# Patient Record
Sex: FEMALE | Race: WHITE | NOT HISPANIC OR LATINO | Employment: OTHER | URBAN - METROPOLITAN AREA
[De-identification: names, ages, dates, MRNs, and addresses within clinical notes are randomized per-mention and may not be internally consistent; named-entity substitution may affect disease eponyms.]

---

## 2019-10-27 PROBLEM — R49.0 DYSPHONIA: Status: ACTIVE | Noted: 2019-10-27

## 2019-10-27 PROBLEM — J30.2 SEASONAL ALLERGIC RHINITIS: Status: ACTIVE | Noted: 2019-10-27

## 2019-10-27 PROBLEM — J38.3 GLOTTIC INSUFFICIENCY: Status: ACTIVE | Noted: 2019-10-27

## 2019-10-27 PROBLEM — R13.14 PHARYNGOESOPHAGEAL DYSPHAGIA: Status: ACTIVE | Noted: 2019-10-27

## 2019-10-27 PROBLEM — Z98.890 HISTORY OF CAROTID ENDARTERECTOMY: Status: ACTIVE | Noted: 2019-10-27

## 2019-10-27 PROBLEM — J38.01 PARALYSIS OF LEFT VOCAL FOLD: Status: ACTIVE | Noted: 2019-10-27

## 2019-10-27 PROBLEM — R49.0 MUSCLE TENSION DYSPHONIA: Status: ACTIVE | Noted: 2019-10-27

## 2019-10-27 PROBLEM — Z87.891 HISTORY OF TOBACCO USE: Status: ACTIVE | Noted: 2019-10-27

## 2019-11-26 PROBLEM — R76.8 ANA POSITIVE: Status: ACTIVE | Noted: 2019-11-26

## 2020-04-23 ENCOUNTER — TELEPHONE (OUTPATIENT)
Dept: FAMILY MEDICINE CLINIC | Facility: CLINIC | Age: 80
End: 2020-04-23

## 2020-04-23 DIAGNOSIS — J30.2 SEASONAL ALLERGIES: ICD-10-CM

## 2020-04-23 RX ORDER — FLUTICASONE PROPIONATE 50 MCG
SPRAY, SUSPENSION (ML) NASAL
Qty: 9.9 ML | Refills: 3 | Status: SHIPPED | OUTPATIENT
Start: 2020-04-23 | End: 2020-09-11 | Stop reason: SDUPTHER

## 2020-04-24 ENCOUNTER — TELEMEDICINE (OUTPATIENT)
Dept: FAMILY MEDICINE CLINIC | Facility: CLINIC | Age: 80
End: 2020-04-24
Payer: MEDICARE

## 2020-04-24 DIAGNOSIS — L08.9 SKIN INFECTION: ICD-10-CM

## 2020-04-24 DIAGNOSIS — B35.0 SCALP DERMATOPHYTOSIS: ICD-10-CM

## 2020-04-24 DIAGNOSIS — L40.9 SCALP PSORIASIS: Primary | ICD-10-CM

## 2020-04-24 PROBLEM — I83.813 VARICOSE VEINS OF BILATERAL LOWER EXTREMITIES WITH PAIN: Status: ACTIVE | Noted: 2019-04-28

## 2020-04-24 PROBLEM — I10 ESSENTIAL HYPERTENSION: Status: ACTIVE | Noted: 2017-11-08

## 2020-04-24 PROBLEM — E55.9 VITAMIN D DEFICIENCY: Status: ACTIVE | Noted: 2017-11-08

## 2020-04-24 PROBLEM — E78.2 MIXED HYPERLIPIDEMIA: Status: ACTIVE | Noted: 2017-11-08

## 2020-04-24 PROBLEM — J38.00 VOCAL CORD PARESIS: Status: ACTIVE | Noted: 2019-11-04

## 2020-04-24 PROBLEM — E87.1 HYPONATREMIA: Status: ACTIVE | Noted: 2020-01-10

## 2020-04-24 PROCEDURE — 99214 OFFICE O/P EST MOD 30 MIN: CPT | Performed by: NURSE PRACTITIONER

## 2020-04-24 RX ORDER — FLUTICASONE PROPIONATE 50 MCG
1 SPRAY, SUSPENSION (ML) NASAL
COMMUNITY
Start: 2019-10-09 | End: 2020-04-24 | Stop reason: SDUPTHER

## 2020-04-24 RX ORDER — LATANOPROST 50 UG/ML
SOLUTION/ DROPS OPHTHALMIC
COMMUNITY
Start: 2003-11-20 | End: 2021-09-17

## 2020-04-24 RX ORDER — ASPIRIN 81 MG/1
81 TABLET, COATED ORAL DAILY
COMMUNITY
Start: 2020-02-27 | End: 2020-05-22 | Stop reason: SDUPTHER

## 2020-04-24 RX ORDER — LABETALOL 100 MG/1
TABLET, FILM COATED ORAL
COMMUNITY
Start: 2020-04-10 | End: 2020-09-11 | Stop reason: SDUPTHER

## 2020-04-24 RX ORDER — KETOCONAZOLE 20 MG/ML
SHAMPOO TOPICAL
COMMUNITY
Start: 2020-02-14 | End: 2020-09-11 | Stop reason: SDUPTHER

## 2020-04-24 RX ORDER — METHYLPREDNISOLONE 4 MG/1
TABLET ORAL
Qty: 21 EACH | Refills: 0 | Status: SHIPPED | OUTPATIENT
Start: 2020-04-24 | End: 2020-09-11 | Stop reason: ALTCHOICE

## 2020-04-24 RX ORDER — HYDROXYZINE HCL 10 MG/5 ML
10 SOLUTION, ORAL ORAL 2 TIMES DAILY
Qty: 100 ML | Refills: 0 | Status: SHIPPED | OUTPATIENT
Start: 2020-04-24 | End: 2020-09-11 | Stop reason: ALTCHOICE

## 2020-04-24 RX ORDER — CEPHALEXIN 250 MG/5ML
500 POWDER, FOR SUSPENSION ORAL EVERY 8 HOURS SCHEDULED
Qty: 300 ML | Refills: 0 | Status: SHIPPED | OUTPATIENT
Start: 2020-04-24 | End: 2020-05-04

## 2020-04-24 RX ORDER — NYSTATIN 100000 U/G
OINTMENT TOPICAL 2 TIMES DAILY
Qty: 60 G | Refills: 0 | Status: SHIPPED | OUTPATIENT
Start: 2020-04-24 | End: 2021-04-09 | Stop reason: ALTCHOICE

## 2020-04-24 RX ORDER — ATORVASTATIN CALCIUM 40 MG/1
80 TABLET, FILM COATED ORAL
COMMUNITY
End: 2020-09-11 | Stop reason: SDUPTHER

## 2020-04-24 RX ORDER — DIPHENOXYLATE HYDROCHLORIDE AND ATROPINE SULFATE 2.5; .025 MG/1; MG/1
1 TABLET ORAL DAILY
COMMUNITY

## 2020-04-24 RX ORDER — CYCLOSPORINE 0.5 MG/ML
1 EMULSION OPHTHALMIC EVERY 12 HOURS
COMMUNITY
End: 2020-04-24 | Stop reason: SDUPTHER

## 2020-04-30 ENCOUNTER — TELEPHONE (OUTPATIENT)
Dept: INTERNAL MEDICINE CLINIC | Facility: CLINIC | Age: 80
End: 2020-04-30

## 2020-05-04 ENCOUNTER — TELEMEDICINE (OUTPATIENT)
Dept: DERMATOLOGY | Facility: CLINIC | Age: 80
End: 2020-05-04
Payer: MEDICARE

## 2020-05-04 DIAGNOSIS — L40.9 PSORIASIS OF SCALP: Primary | ICD-10-CM

## 2020-05-04 PROCEDURE — 99203 OFFICE O/P NEW LOW 30 MIN: CPT | Performed by: DERMATOLOGY

## 2020-05-04 RX ORDER — CLOBETASOL PROPIONATE 0.46 MG/ML
SOLUTION TOPICAL 2 TIMES DAILY
Qty: 50 ML | Refills: 0 | Status: SHIPPED | OUTPATIENT
Start: 2020-05-04 | End: 2020-05-12 | Stop reason: SDUPTHER

## 2020-05-12 ENCOUNTER — TELEMEDICINE (OUTPATIENT)
Dept: DERMATOLOGY | Facility: CLINIC | Age: 80
End: 2020-05-12
Payer: MEDICARE

## 2020-05-12 DIAGNOSIS — L40.9 PSORIASIS: Primary | ICD-10-CM

## 2020-05-12 DIAGNOSIS — L40.9 PSORIASIS OF SCALP: ICD-10-CM

## 2020-05-12 PROCEDURE — 99213 OFFICE O/P EST LOW 20 MIN: CPT | Performed by: DERMATOLOGY

## 2020-05-12 RX ORDER — CLOBETASOL PROPIONATE 0.46 MG/ML
SOLUTION TOPICAL DAILY
Qty: 50 ML | Refills: 5 | Status: SHIPPED | OUTPATIENT
Start: 2020-05-12 | End: 2020-06-15

## 2020-05-22 DIAGNOSIS — D73.5 SPLENIC INFARCTION: Primary | ICD-10-CM

## 2020-05-22 RX ORDER — ASPIRIN 81 MG/1
81 TABLET, COATED ORAL DAILY
Qty: 90 TABLET | Refills: 1 | Status: SHIPPED | OUTPATIENT
Start: 2020-05-22 | End: 2020-09-11 | Stop reason: SDUPTHER

## 2020-05-22 RX ORDER — CLOPIDOGREL BISULFATE 75 MG/1
75 TABLET ORAL DAILY
Qty: 90 TABLET | Refills: 1 | Status: SHIPPED | OUTPATIENT
Start: 2020-05-22 | End: 2020-09-11 | Stop reason: SDUPTHER

## 2020-06-13 DIAGNOSIS — L40.9 PSORIASIS OF SCALP: ICD-10-CM

## 2020-06-15 RX ORDER — CLOBETASOL PROPIONATE 0.46 MG/ML
SOLUTION TOPICAL
Qty: 50 ML | Refills: 3 | Status: SHIPPED | OUTPATIENT
Start: 2020-06-15 | End: 2020-09-11 | Stop reason: ALTCHOICE

## 2020-08-12 ENCOUNTER — TELEPHONE (OUTPATIENT)
Dept: FAMILY MEDICINE CLINIC | Facility: CLINIC | Age: 80
End: 2020-08-12

## 2020-08-12 DIAGNOSIS — E55.9 VITAMIN D DEFICIENCY: ICD-10-CM

## 2020-08-12 DIAGNOSIS — I25.10 CORONARY ARTERY DISEASE DUE TO LIPID RICH PLAQUE: ICD-10-CM

## 2020-08-12 DIAGNOSIS — I25.83 CORONARY ARTERY DISEASE DUE TO LIPID RICH PLAQUE: ICD-10-CM

## 2020-08-12 DIAGNOSIS — E78.2 MIXED HYPERLIPIDEMIA: ICD-10-CM

## 2020-08-12 DIAGNOSIS — I10 ESSENTIAL HYPERTENSION: Primary | ICD-10-CM

## 2020-08-12 NOTE — TELEPHONE ENCOUNTER
Patient called asking for blood work prior to her appointment on 9/4   Please fax scripts to lab [facility] at [phone]

## 2020-08-28 ENCOUNTER — TELEPHONE (OUTPATIENT)
Dept: FAMILY MEDICINE CLINIC | Facility: CLINIC | Age: 80
End: 2020-08-28

## 2020-08-28 DIAGNOSIS — J30.2 SEASONAL ALLERGIES: Primary | ICD-10-CM

## 2020-08-30 RX ORDER — MONTELUKAST SODIUM 10 MG/1
10 TABLET ORAL
Qty: 90 TABLET | Refills: 1 | Status: SHIPPED | OUTPATIENT
Start: 2020-08-30 | End: 2020-09-11 | Stop reason: SDUPTHER

## 2020-09-10 LAB
ALBUMIN SERPL-MCNC: 4.1 G/DL (ref 3.7–4.7)
ALBUMIN/GLOB SERPL: 1.5 {RATIO} (ref 1.2–2.2)
ALP SERPL-CCNC: 106 IU/L (ref 39–117)
ALT SERPL-CCNC: 25 IU/L (ref 0–32)
APPEARANCE UR: CLEAR
AST SERPL-CCNC: 30 IU/L (ref 0–40)
BACTERIA URNS QL MICRO: NORMAL
BASOPHILS # BLD AUTO: 0 X10E3/UL (ref 0–0.2)
BASOPHILS NFR BLD AUTO: 1 %
BILIRUB DIRECT SERPL-MCNC: 0.16 MG/DL (ref 0–0.4)
BILIRUB SERPL-MCNC: 0.5 MG/DL (ref 0–1.2)
BILIRUB UR QL STRIP: NEGATIVE
BUN SERPL-MCNC: 11 MG/DL (ref 8–27)
BUN/CREAT SERPL: 13 (ref 12–28)
CALCIUM SERPL-MCNC: 9.2 MG/DL (ref 8.7–10.3)
CHLORIDE SERPL-SCNC: 91 MMOL/L (ref 96–106)
CHOLEST SERPL-MCNC: 141 MG/DL (ref 100–199)
CO2 SERPL-SCNC: 27 MMOL/L (ref 20–29)
COLOR UR: YELLOW
CREAT SERPL-MCNC: 0.83 MG/DL (ref 0.57–1)
EOSINOPHIL # BLD AUTO: 0.2 X10E3/UL (ref 0–0.4)
EOSINOPHIL NFR BLD AUTO: 5 %
EPI CELLS #/AREA URNS HPF: NORMAL /HPF (ref 0–10)
ERYTHROCYTE [DISTWIDTH] IN BLOOD BY AUTOMATED COUNT: 11.7 % (ref 11.7–15.4)
GLOBULIN SER-MCNC: 2.8 G/DL (ref 1.5–4.5)
GLUCOSE SERPL-MCNC: 97 MG/DL (ref 65–99)
GLUCOSE UR QL: NEGATIVE
HCT VFR BLD AUTO: 37.1 % (ref 34–46.6)
HDLC SERPL-MCNC: 62 MG/DL
HGB BLD-MCNC: 12.8 G/DL (ref 11.1–15.9)
HGB UR QL STRIP: NEGATIVE
IMM GRANULOCYTES # BLD: 0 X10E3/UL (ref 0–0.1)
IMM GRANULOCYTES NFR BLD: 0 %
KETONES UR QL STRIP: NEGATIVE
LDLC SERPL CALC-MCNC: 68 MG/DL (ref 0–99)
LEUKOCYTE ESTERASE UR QL STRIP: NEGATIVE
LYMPHOCYTES # BLD AUTO: 1 X10E3/UL (ref 0.7–3.1)
LYMPHOCYTES NFR BLD AUTO: 21 %
MCH RBC QN AUTO: 33.3 PG (ref 26.6–33)
MCHC RBC AUTO-ENTMCNC: 34.5 G/DL (ref 31.5–35.7)
MCV RBC AUTO: 97 FL (ref 79–97)
MICRO URNS: ABNORMAL
MICRO URNS: ABNORMAL
MONOCYTES # BLD AUTO: 0.5 X10E3/UL (ref 0.1–0.9)
MONOCYTES NFR BLD AUTO: 11 %
MUCOUS THREADS URNS QL MICRO: PRESENT
NEUTROPHILS # BLD AUTO: 3 X10E3/UL (ref 1.4–7)
NEUTROPHILS NFR BLD AUTO: 62 %
NITRITE UR QL STRIP: NEGATIVE
PH UR STRIP: 8 [PH] (ref 5–7.5)
PLATELET # BLD AUTO: 232 X10E3/UL (ref 150–450)
POTASSIUM SERPL-SCNC: 4.3 MMOL/L (ref 3.5–5.2)
PROT SERPL-MCNC: 6.9 G/DL (ref 6–8.5)
PROT UR QL STRIP: NEGATIVE
RBC # BLD AUTO: 3.84 X10E6/UL (ref 3.77–5.28)
RBC #/AREA URNS HPF: NORMAL /HPF (ref 0–2)
SL AMB EGFR AFRICAN AMERICAN: 78 ML/MIN/1.73
SL AMB EGFR NON AFRICAN AMERICAN: 68 ML/MIN/1.73
SL AMB VLDL CHOLESTEROL CALC: 11 MG/DL (ref 5–40)
SODIUM SERPL-SCNC: 128 MMOL/L (ref 134–144)
SP GR UR: 1.01 (ref 1–1.03)
TRIGL SERPL-MCNC: 47 MG/DL (ref 0–149)
TSH SERPL DL<=0.005 MIU/L-ACNC: 2.98 UIU/ML (ref 0.45–4.5)
UROBILINOGEN UR STRIP-ACNC: 0.2 MG/DL (ref 0.2–1)
WBC # BLD AUTO: 4.8 X10E3/UL (ref 3.4–10.8)
WBC #/AREA URNS HPF: NORMAL /HPF (ref 0–5)

## 2020-09-11 ENCOUNTER — OFFICE VISIT (OUTPATIENT)
Dept: FAMILY MEDICINE CLINIC | Facility: CLINIC | Age: 80
End: 2020-09-11
Payer: MEDICARE

## 2020-09-11 DIAGNOSIS — D73.5 SPLENIC INFARCTION: ICD-10-CM

## 2020-09-11 DIAGNOSIS — J30.2 SEASONAL ALLERGIC RHINITIS, UNSPECIFIED TRIGGER: ICD-10-CM

## 2020-09-11 DIAGNOSIS — I10 ESSENTIAL HYPERTENSION: Primary | ICD-10-CM

## 2020-09-11 DIAGNOSIS — R23.8 DRY SCALP: ICD-10-CM

## 2020-09-11 DIAGNOSIS — E87.1 HYPONATREMIA: ICD-10-CM

## 2020-09-11 DIAGNOSIS — E55.9 VITAMIN D DEFICIENCY: ICD-10-CM

## 2020-09-11 DIAGNOSIS — J30.2 SEASONAL ALLERGIES: ICD-10-CM

## 2020-09-11 DIAGNOSIS — E78.2 MIXED HYPERLIPIDEMIA: ICD-10-CM

## 2020-09-11 PROCEDURE — 99214 OFFICE O/P EST MOD 30 MIN: CPT | Performed by: NURSE PRACTITIONER

## 2020-09-11 RX ORDER — FLUTICASONE PROPIONATE 50 MCG
SPRAY, SUSPENSION (ML) NASAL
Qty: 9.9 ML | Refills: 3 | Status: SHIPPED | OUTPATIENT
Start: 2020-09-11 | End: 2021-10-06 | Stop reason: SDUPTHER

## 2020-09-11 RX ORDER — KETOCONAZOLE 20 MG/ML
1 SHAMPOO TOPICAL 2 TIMES WEEKLY
Qty: 360 ML | Refills: 1 | Status: SHIPPED | OUTPATIENT
Start: 2020-09-14 | End: 2021-04-09 | Stop reason: ALTCHOICE

## 2020-09-11 RX ORDER — LABETALOL 100 MG/1
200 TABLET, FILM COATED ORAL EVERY 8 HOURS
Qty: 540 TABLET | Refills: 1 | Status: SHIPPED | OUTPATIENT
Start: 2020-09-11 | End: 2021-01-14 | Stop reason: SDUPTHER

## 2020-09-11 RX ORDER — ATORVASTATIN CALCIUM 40 MG/1
80 TABLET, FILM COATED ORAL DAILY
Qty: 180 TABLET | Refills: 1 | Status: SHIPPED | OUTPATIENT
Start: 2020-09-11 | End: 2021-01-07

## 2020-09-11 RX ORDER — SODIUM CHLORIDE 1000 MG
1 TABLET, SOLUBLE MISCELLANEOUS 2 TIMES DAILY
Qty: 60 TABLET | Refills: 1 | Status: SHIPPED | OUTPATIENT
Start: 2020-09-11 | End: 2021-04-09 | Stop reason: ALTCHOICE

## 2020-09-11 RX ORDER — CLOPIDOGREL BISULFATE 75 MG/1
75 TABLET ORAL DAILY
Qty: 90 TABLET | Refills: 1 | Status: SHIPPED | OUTPATIENT
Start: 2020-09-11 | End: 2021-03-02 | Stop reason: SDUPTHER

## 2020-09-11 RX ORDER — FUROSEMIDE 20 MG/1
20 TABLET ORAL 2 TIMES DAILY
Qty: 180 TABLET | Refills: 1 | Status: SHIPPED | OUTPATIENT
Start: 2020-09-11 | End: 2021-04-18

## 2020-09-11 RX ORDER — LOSARTAN POTASSIUM 100 MG/1
100 TABLET ORAL DAILY
Qty: 30 TABLET | Refills: 15 | Status: SHIPPED | OUTPATIENT
Start: 2020-09-11 | End: 2021-01-07 | Stop reason: SDUPTHER

## 2020-09-11 RX ORDER — ASPIRIN 81 MG/1
81 TABLET, COATED ORAL DAILY
Qty: 90 TABLET | Refills: 1 | Status: SHIPPED | OUTPATIENT
Start: 2020-09-11 | End: 2021-05-12 | Stop reason: SDUPTHER

## 2020-09-11 RX ORDER — MONTELUKAST SODIUM 10 MG/1
10 TABLET ORAL
Qty: 90 TABLET | Refills: 1 | Status: SHIPPED | OUTPATIENT
Start: 2020-09-11 | End: 2021-03-01

## 2020-09-11 NOTE — PROGRESS NOTES
BMI Counseling: Body mass index is 25 25 kg/m²  The BMI is above normal  Nutrition recommendations include decreasing portion sizes, encouraging healthy choices of fruits and vegetables, decreasing fast food intake, consuming healthier snacks, limiting drinks that contain sugar, moderation in carbohydrate intake, increasing intake of lean protein, reducing intake of saturated and trans fat and reducing intake of cholesterol  Exercise recommendations include exercising 3-5 times per week  No pharmacotherapy was ordered  Patient referred to PCP due to patient being overweight  Depression Screening and Follow-up Plan: Patient's depression screening was positive with a PHQ-2 score of 0  Clincally patient does not have depression  No treatment is required  Falls Plan of Care: Medications that increase falls were reviewed  Assessed feet and footwear  Home safety education provided       Assessment/Plan:    Elevated BP on arrival - repeat BP in office was 140/90   I will call patient again later at home and follow up   She does have white coat syndrome and  Her  drove her and upset her, Usually at home Bps are stable   Seasonal allergies under control   Sees Cardiology   Hyponatremia continues to be an issues   I will supplement daily going foreword and have her follow up with Nephrology   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   CAD s/p  - stable denies any issues   History of abnormal blood counts have stabilized   Will review labs        Problem List Items Addressed This Visit        Respiratory    Seasonal allergic rhinitis    Relevant Orders    CBC and differential    TSH, 3rd generation       Cardiovascular and Mediastinum    Essential hypertension - Primary    Relevant Medications    losartan (COZAAR) 100 MG tablet    furosemide (LASIX) 20 mg tablet    labetalol (NORMODYNE) 100 mg tablet    Other Relevant Orders    Comprehensive metabolic panel    TSH, 3rd generation    UA (URINE) with reflex to Scope    Lipid panel       Other    Hyponatremia     Contains abnormal data  Comprehensive metabolic panel   Order: [id number]   Status:  Final result   Visible to patient:  No (inaccessible in MyChart)   Next appt:  03/04/2021 at 11:30 AM in Otolaryngology Magnus Nguyen MD)    Ref Range & Units  9/9/20 7:12 AM   Glucose, Random  65 - 99 mg/dL  97    BUN  8 - 27 mg/dL  11    Creatinine  0 57 - 1 00 mg/dL  0 83    eGFR Non   >59 mL/min/1 73  68    eGFR   >59 mL/min/1 73  78    SL AMB BUN/CREATININE RATIO  12 - 28  13    Sodium  134 - 144 mmol/L  128Low      Potassium  3 5 - 5 2 mmol/L  4 3    Chloride  96 - 106 mmol/L  91Low      CO2  20 - 29 mmol/L  27    CALCIUM  8 7 - 10 3 mg/dL  9 2    Protein, Total  6 0 - 8 5 g/dL  6 9    Albumin  3 7 - 4 7 g/dL  4 1    Globulin, Total  1 5 - 4 5 g/dL  2 8    Albumin/Globulin Ratio  1 2 - 2 2  1 5    TOTAL BILIRUBIN  0 0 - 1 2 mg/dL  0 5    Alk Phos Isoenzymes  39 - 117 IU/L  106    AST  0 - 40 IU/L  30    ALT  0 - 32 IU/L  25       Narrative     Performed at: [address]  [id number]   : Mani Bailey MD, Phone:  [phone]       Specimen Collected: 09/09/20  7:12 AM    Last Resulted: 09/10/20  6:05 PM                     Relevant Medications    sodium chloride 1 g tablet    Other Relevant Orders    Electrolyte panel    Comprehensive metabolic panel    TSH, 3rd generation    Mixed hyperlipidemia      Ref Range & Units  9/9/20 7:12 AM   Cholesterol, Total  100 - 199 mg/dL  141    Triglycerides  0 - 149 mg/dL  47    HDL  >39 mg/dL  62    VLDL Cholesterol Calculated  5 - 40 mg/dL  11    LDL Calculated  0 - 99 mg/dL  68       Narrative     Performed at: [address]  [id number]   : Mani Bailey MD, Phone:  [phone]       Specimen Collected: 09/09/20  7:12 AM        STABLE          Relevant Medications    atorvastatin (LIPITOR) 40 mg tablet    Other Relevant Orders    TSH, 3rd generation    Vitamin D deficiency    Relevant Orders    TSH, 3rd generation      Other Visit Diagnoses     Seasonal allergies        Relevant Medications    montelukast (SINGULAIR) 10 mg tablet    fluticasone ([facility] Fluticasone Propionate) 50 mcg/act nasal spray    Splenic infarction        Relevant Medications    Aspirin Low Dose 81 MG EC tablet    clopidogrel (PLAVIX) 75 mg tablet    Dry scalp        Relevant Medications    ketoconazole (NIZORAL) 2 % shampoo            Subjective:      Patient ID: Anastasiya Novoa is a 68 y o  female  Presents in office for 4 month follow up   HTN - does have some elevations with stress and white coat syndrome however mostly stable Under care of Cardiolody   CAD - stable - continues plavix   Hyperlipidemia - stable continues statin  Hyponatremia - denies any issues - asymptomatic has been a chronic issue on and off was seen by renal in the past   Voice issues s/p  improvig with therapy   Denies any abdominal issues       The following portions of the patient's history were reviewed and updated as appropriate:   She has a past medical history of Allergic rhinitis, Anxiety disorder, Arthritis, Dizziness, Dry eye syndrome, Essential hypertension, malignant, Pure hypercholesterolemia, and Splenic infarction  ,  does not have any pertinent problems on file  ,   has a past surgical history that includes Sinus surgery; Cardiac surgery (2019); Throat surgery (2000); Eye surgery; and Cosmetic surgery  ,  family history includes Cancer in her maternal aunt; Heart disease in her mother; Other in her maternal grandfather and sister  ,   reports that she quit smoking about 3 years ago  Her smoking use included cigarettes  She has a 12 50 pack-year smoking history  She has never used smokeless tobacco  She reports current alcohol use  She reports that she does not use drugs  ,  is allergic to potassium chloride and xanax [alprazolam]     Current Outpatient Medications   Medication Sig Dispense Refill    Aspirin Low Dose 81 MG EC tablet Take 1 tablet (81 mg total) by mouth daily 90 tablet 1    atorvastatin (LIPITOR) 40 mg tablet Take 2 tablets (80 mg total) by mouth daily 180 tablet 1    B Complex Vitamins (B COMPLEX 1 PO) Take 1 tablet by mouth 2 (two) times a day      clopidogrel (PLAVIX) 75 mg tablet Take 1 tablet (75 mg total) by mouth daily 90 tablet 1    cycloSPORINE (RESTASIS) 0 05 % ophthalmic emulsion Apply 1 drop to eye 2 (two) times a day      fluticasone ([facility] Fluticasone Propionate) 50 mcg/act nasal spray Spray 1 spray into each nostril every day 9 9 mL 3    furosemide (LASIX) 20 mg tablet Take 1 tablet (20 mg total) by mouth 2 (two) times a day 180 tablet 1    labetalol (NORMODYNE) 100 mg tablet Take 2 tablets (200 mg total) by mouth every 8 (eight) hours 540 tablet 1    latanoprost (Xalatan) 0 005 % ophthalmic solution 2 drops each eye  in PM      losartan (COZAAR) 100 MG tablet Take 1 tablet (100 mg total) by mouth daily 30 tablet 15    montelukast (SINGULAIR) 10 mg tablet Take 1 tablet (10 mg total) by mouth daily at bedtime 90 tablet 1    multivitamin (THERAGRAN) TABS Take 1 tablet by mouth daily      olopatadine HCl (PATADAY) 0 2 % opth drops Apply 1 drop to eye 2 (two) times a day      ketoconazole (NIZORAL) 2 % shampoo Apply 1 application topically 2 (two) times a week 360 mL 1    nystatin (MYCOSTATIN) ointment Apply topically 2 (two) times a day for 10 days Apply to scalp 60 g 0    sodium chloride 1 g tablet Take 1 tablet (1 g total) by mouth 2 (two) times a day 60 tablet 1     No current facility-administered medications for this visit  Review of Systems   Constitutional: Positive for fatigue  Negative for chills and fever  HENT: Positive for congestion  Negative for postnasal drip and sore throat  Eyes: Negative for pain, discharge and itching  Respiratory: Negative for cough and shortness of breath  Cardiovascular: Negative for chest pain and leg swelling  HTN - labetalol was increased to three times a day   Under care of Cardiology   hyperlipidemia    in the past    Gastrointestinal: Negative for abdominal distention and abdominal pain  Endocrine: Positive for cold intolerance  Genitourinary: Negative for difficulty urinating, dysuria and flank pain  Musculoskeletal: Positive for myalgias  Skin: Positive for rash (still has some scalp erythema but much better )  Allergic/Immunologic: Positive for environmental allergies  Neurological: Negative for dizziness, weakness and headaches  Hematological: Negative for adenopathy  Psychiatric/Behavioral: Negative for sleep disturbance and suicidal ideas  The patient is not nervous/anxious  Objective:  Vitals:    20 1239 20 1750   BP: (!) 190/80 138/88   BP Location: Right arm    Patient Position: Sitting    Cuff Size: Standard    Pulse: 74    Resp: 14    Temp: 98 8 °F (37 1 °C)    TempSrc: Temporal    SpO2: 96%    Weight: 77 6 kg (171 lb)    Height: 5' 9" (1 753 m)      Body mass index is 25 25 kg/m²  Physical Exam  Vitals signs and nursing note reviewed  Constitutional:       Appearance: Normal appearance  HENT:      Head: Atraumatic  Nose: Nose normal    Eyes:      Extraocular Movements: Extraocular movements intact  Neck:      Musculoskeletal: Normal range of motion  Cardiovascular:      Rate and Rhythm: Normal rate and regular rhythm  Pulses: Normal pulses  Heart sounds: Normal heart sounds  Comments: Elevated Bps see notes  Does have white coat syndrome   Pulmonary:      Effort: Pulmonary effort is normal       Breath sounds: Normal breath sounds  Abdominal:      General: Abdomen is flat  Palpations: Abdomen is soft  Musculoskeletal: Normal range of motion  Skin:     General: Skin is warm        Comments: Still some erythema to scalp    Neurological:      General: No focal deficit present  Mental Status: She is alert and oriented to person, place, and time  Psychiatric:         Mood and Affect: Mood normal          Behavior: Behavior normal        Contains abnormal data  CBC and differential   Order: [id number]   Status:  Final result   Visible to patient:  No ([facility]   Next appt:  03/04/2021 at 11:30 AM in Otolaryngology Angelina Hsu MD)    Ref Range & Units  9/9/20 7:12 AM   White Blood Cell Count  3 4 - 10 8 x10E3/uL  4 8    Red Blood Cell Count  3 77 - 5 28 x10E6/uL  3 84    Hemoglobin  11 1 - 15 9 g/dL  12 8    HCT  34 0 - 46 6 %  37 1    MCV  79 - 97 fL  97    MCH  26 6 - 33 0 pg  33  3High      MCHC  31 5 - 35 7 g/dL  34 5    RDW  11 7 - 15 4 %  11 7    Platelet Count  754 - 450 x10E3/uL  232    Neutrophils  Not Estab  %  62    Lymphocytes  Not Estab  %  21    Monocytes  Not Estab  %  11    Eosinophils  Not Estab  %  5    Basophils PCT  Not Estab  %  1    Neutrophils (Absolute)  1 4 - 7 0 x10E3/uL  3 0    Lymphocytes (Absolute)  0 7 - 3 1 x10E3/uL  1 0    Monocytes (Absolute)  0 1 - 0 9 x10E3/uL  0 5    Eosinophils (Absolute)  0 0 - 0 4 x10E3/uL  0 2    Basophils ABS  0 0 - 0 2 x10E3/uL  0 0    Immature Granulocytes  Not Estab  %  0    Immature Granulocytes (Absolute)  0 0 - 0 1 x10E3/uL  0 0       Narrative           TSH, 3rd generation   Order: [id number]   Status:  Final result   Visible to patient:  No ([facility])   Next appt:  03/04/2021 at 11:30 AM in Otolaryngology Angelina Hsu MD)    Ref Range & Units  9/9/20 7:12 AM   TSH  0 450 - 4 500 uIU/mL  2 980       Narrative     Performed at: [address]  [id number]   : Carmenza Gudino MD, Phone:  [phone]       Specimen Collected: 09/09/20  7:12 AM    Last Resulted: 09/10/20  6:05 PM                Ref Range & Units  9/9/20 7:12 AM   Bilirubin, Direct  0 00 - 0 40 mg/dL  0 16       Narrative     Performed at: [address] [facility] [place], [place]  [id number]   : Raul Thacker MD, Phone:  [phone]       Specimen Collected: 09/09/20  7:12 AM

## 2020-09-15 VITALS
DIASTOLIC BLOOD PRESSURE: 88 MMHG | RESPIRATION RATE: 14 BRPM | OXYGEN SATURATION: 96 % | HEIGHT: 69 IN | BODY MASS INDEX: 25.33 KG/M2 | HEART RATE: 74 BPM | SYSTOLIC BLOOD PRESSURE: 138 MMHG | TEMPERATURE: 98.8 F | WEIGHT: 171 LBS

## 2020-09-15 NOTE — ASSESSMENT & PLAN NOTE
Ref Range & Units  9/9/20 7:12 AM   Cholesterol, Total  100 - 199 mg/dL  141    Triglycerides  0 - 149 mg/dL  47    HDL  >39 mg/dL  62    VLDL Cholesterol Calculated  5 - 40 mg/dL  11    LDL Calculated  0 - 99 mg/dL  68       Narrative     Performed at: [address] [place] [address]  [id number]   : Rosa Baca MD, Phone:  [phone]       Specimen Collected: 09/09/20  7:12 AM        STABLE

## 2020-09-15 NOTE — PATIENT INSTRUCTIONS
Hypertension   WHAT YOU NEED TO KNOW:   Hypertension is high blood pressure (BP)  Your BP is the force of your blood moving against the walls of your arteries  Normal BP is less than 120/80  Prehypertension is between 120/80 and 139/89  Hypertension is 140/90 or higher  Hypertension causes your BP to get so high that your heart has to work much harder than normal  This can damage your heart  You can control hypertension with a healthy lifestyle or medicines  A controlled blood pressure helps protect your organs, such as your heart, lungs, brain, and kidneys  DISCHARGE INSTRUCTIONS:   Call 911 for any of the following:   · You have discomfort in your chest that feels like squeezing, pressure, fullness, or pain  · You become confused or have difficulty speaking  · You suddenly feel lightheaded or have trouble breathing  · You have pain or discomfort in your back, neck, jaw, stomach, or arm  Return to the emergency department if:   · You have a severe headache or vision loss  · You have weakness in an arm or leg  Contact your healthcare provider if:   · You feel faint, dizzy, confused, or drowsy  · You have been taking your BP medicine and your BP is still higher than your healthcare provider says it should be  · You have questions or concerns about your condition or care  Medicines: You may  need any of the following:  · Medicine  may be used to help lower your BP  You may need more than one type of medicine  Take the medicine exactly as directed  · Diuretics  help decrease extra fluid that collects in your body  This will help lower your BP  You may urinate more often while you take this medicine  · Cholesterol medicine  helps lower your cholesterol level  A low cholesterol level helps prevent heart disease and makes it easier to control your blood pressure  · Take your medicine as directed    Contact your healthcare provider if you think your medicine is not helping or if you have side effects  Tell him or her if you are allergic to any medicine  Keep a list of the medicines, vitamins, and herbs you take  Include the amounts, and when and why you take them  Bring the list or the pill bottles to follow-up visits  Carry your medicine list with you in case of an emergency  Follow up with your healthcare provider as directed: You will need to return to have your BP checked and to have other lab tests done  Write down your questions so you remember to ask them during your visits  Manage hypertension:  Talk with your healthcare provider about these and other ways to manage hypertension:  · Check your BP at home  Sit and rest for 5 minutes before you take your BP  Extend your arm and support it on a flat surface  Your arm should be at the same level as your heart  Follow the directions that came with your BP monitor  If possible, take at least 2 BP readings each time  Take your BP at least twice a day at the same times each day, such as morning and evening  Keep a record of your BP readings and bring it to your follow-up visits  Ask your healthcare provider what your BP should be  · Limit sodium (salt) as directed  Too much sodium can affect your fluid balance  Check labels to find low-sodium or no-salt-added foods  Some low-sodium foods use potassium salts for flavor  Too much potassium can also cause health problems  Your healthcare provider will tell you how much sodium and potassium are safe for you to have in a day  He or she may recommend that you limit sodium to 2,300 mg a day  · Follow the meal plan recommended by your healthcare provider  A dietitian or your provider can give you more information on low-sodium plans or the DASH (Dietary Approaches to Stop Hypertension) eating plan  The DASH plan is low in sodium, unhealthy fats, and total fat  It is high in potassium, calcium, and fiber  · Exercise to maintain a healthy weight    Exercise at least 30 minutes per day, on most days of the week  This will help decrease your blood pressure  Ask your healthcare provider about the best exercise plan for you  · Decrease stress  This may help lower your BP  Learn ways to relax, such as deep breathing or listening to music  · Limit alcohol  Women should limit alcohol to 1 drink a day  Men should limit alcohol to 2 drinks a day  A drink of alcohol is 12 ounces of beer, 5 ounces of wine, or 1½ ounces of liquor  · Do not smoke  Nicotine and other chemicals in cigarettes and cigars can increase your BP and also cause lung damage  Ask your healthcare provider for information if you currently smoke and need help to quit  E-cigarettes or smokeless tobacco still contain nicotine  Talk to your healthcare provider before you use these products  · Manage any other health conditions you have  Health conditions such as diabetes can increase your risk for hypertension  Follow your healthcare provider's instructions and take all your medicines as directed  © 2017 [address] [employer] Information is for End User's use only and may not be sold, redistributed or otherwise used for commercial purposes  All illustrations and images included in CareNotes® are the copyrighted property of [employer]  or [employer]  The above information is an  only  It is not intended as medical advice for individual conditions or treatments  Talk to your doctor, nurse or pharmacist before following any medical regimen to see if it is safe and effective for you  Hyponatremia   WHAT YOU NEED TO KNOW:   Hyponatremia occurs when the amount of sodium (salt) in your blood is lower than normal  Sodium is an electrolyte (mineral) that helps your muscles, heart, and digestive system work properly  It helps control blood pressure and fluid balance  DISCHARGE INSTRUCTIONS:   Follow up with your healthcare provider as directed:   You may need to return for more tests  Write down your questions so you remember to ask them during your visits  Nutrition:  You may need to increase your intake of sodium  Foods that are high in sodium include milk, packaged snacks such as pretzels, or processed meats (nails, sausage, and ham)  Ask your dietitian to help you create a meal plan that is right for you  Liquids: Follow your healthcare provider's advice if you need to limit the amount of liquid you drink  Ask how much liquid to drink each day and which liquids are best for you  You may be asked to drink liquids that have water, sugar, and salt, such as juices, milk, or sports drinks  These liquids help your body hold in fluid and prevent dehydration  Contact your healthcare provider if:   · You have muscle cramps or twitching  · You feel very weak or tired  · You have nausea or are vomiting  · You have questions or concerns about your condition or care  Return to the emergency department if:   · You have a seizure  · You have an irregular heartbeat  · You have trouble breathing  · You cannot move your arms and legs  · You are confused or cannot think clearly  © 2017 [address] [facility] Information is for End User's use only and may not be sold, redistributed or otherwise used for commercial purposes  All illustrations and images included in CareNotes® are the copyrighted property of [employer]  or [employer]  The above information is an  only  It is not intended as medical advice for individual conditions or treatments  Talk to your doctor, nurse or pharmacist before following any medical regimen to see if it is safe and effective for you

## 2020-09-15 NOTE — ASSESSMENT & PLAN NOTE
Contains abnormal data  Comprehensive metabolic panel   Order: [id number]   Status:  Final result   Visible to patient:  No (inaccessible in MyChart)   Next appt:  03/04/2021 at 11:30 AM in Otolaryngology Adan Reid MD)    Ref Range & Units  9/9/20 7:12 AM   Glucose, Random  65 - 99 mg/dL  97    BUN  8 - 27 mg/dL  11    Creatinine  0 57 - 1 00 mg/dL  0 83    eGFR Non   >59 mL/min/1 73  68    eGFR   >59 mL/min/1 73  78    SL AMB BUN/CREATININE RATIO  12 - 28  13    Sodium  134 - 144 mmol/L  128Low      Potassium  3 5 - 5 2 mmol/L  4 3    Chloride  96 - 106 mmol/L  91Low      CO2  20 - 29 mmol/L  27    CALCIUM  8 7 - 10 3 mg/dL  9 2    Protein, Total  6 0 - 8 5 g/dL  6 9    Albumin  3 7 - 4 7 g/dL  4 1    Globulin, Total  1 5 - 4 5 g/dL  2 8    Albumin/Globulin Ratio  1 2 - 2 2  1 5    TOTAL BILIRUBIN  0 0 - 1 2 mg/dL  0 5    Alk Phos Isoenzymes  39 - 117 IU/L  106    AST  0 - 40 IU/L  30    ALT  0 - 32 IU/L  25       Narrative     Performed at: [address]  [id number]   : Stone Gallegos MD, Phone:  [phone]       Specimen Collected: 09/09/20  7:12 AM    Last Resulted: 09/10/20  6:05 PM

## 2020-12-01 ENCOUNTER — TELEPHONE (OUTPATIENT)
Dept: FAMILY MEDICINE CLINIC | Facility: CLINIC | Age: 80
End: 2020-12-01

## 2020-12-15 LAB
ALBUMIN SERPL-MCNC: 4.2 G/DL (ref 3.7–4.7)
ALBUMIN/GLOB SERPL: 1.7 {RATIO} (ref 1.2–2.2)
ALP SERPL-CCNC: 123 IU/L (ref 39–117)
ALT SERPL-CCNC: 38 IU/L (ref 0–32)
AMBIG ABBREV DEFAULT: NORMAL
AST SERPL-CCNC: 39 IU/L (ref 0–40)
BASOPHILS # BLD AUTO: 0 X10E3/UL (ref 0–0.2)
BASOPHILS NFR BLD AUTO: 1 %
BILIRUB SERPL-MCNC: 0.6 MG/DL (ref 0–1.2)
BUN SERPL-MCNC: 12 MG/DL (ref 8–27)
BUN/CREAT SERPL: 14 (ref 12–28)
CALCIUM SERPL-MCNC: 9 MG/DL (ref 8.7–10.3)
CHLORIDE SERPL-SCNC: 93 MMOL/L (ref 96–106)
CHOLEST SERPL-MCNC: 139 MG/DL (ref 100–199)
CO2 SERPL-SCNC: 23 MMOL/L (ref 20–29)
CREAT SERPL-MCNC: 0.84 MG/DL (ref 0.57–1)
EOSINOPHIL # BLD AUTO: 0.2 X10E3/UL (ref 0–0.4)
EOSINOPHIL NFR BLD AUTO: 3 %
ERYTHROCYTE [DISTWIDTH] IN BLOOD BY AUTOMATED COUNT: 12.1 % (ref 11.7–15.4)
GLOBULIN SER-MCNC: 2.5 G/DL (ref 1.5–4.5)
GLUCOSE SERPL-MCNC: 100 MG/DL (ref 65–99)
HCT VFR BLD AUTO: 35.8 % (ref 34–46.6)
HDLC SERPL-MCNC: 60 MG/DL
HGB BLD-MCNC: 12.7 G/DL (ref 11.1–15.9)
IMM GRANULOCYTES # BLD: 0 X10E3/UL (ref 0–0.1)
IMM GRANULOCYTES NFR BLD: 0 %
LDLC SERPL CALC-MCNC: 67 MG/DL (ref 0–99)
LYMPHOCYTES # BLD AUTO: 1 X10E3/UL (ref 0.7–3.1)
LYMPHOCYTES NFR BLD AUTO: 18 %
MCH RBC QN AUTO: 33.1 PG (ref 26.6–33)
MCHC RBC AUTO-ENTMCNC: 35.5 G/DL (ref 31.5–35.7)
MCV RBC AUTO: 93 FL (ref 79–97)
MONOCYTES # BLD AUTO: 0.5 X10E3/UL (ref 0.1–0.9)
MONOCYTES NFR BLD AUTO: 9 %
NEUTROPHILS # BLD AUTO: 3.8 X10E3/UL (ref 1.4–7)
NEUTROPHILS NFR BLD AUTO: 69 %
PLATELET # BLD AUTO: 256 X10E3/UL (ref 150–450)
POTASSIUM SERPL-SCNC: 5 MMOL/L (ref 3.5–5.2)
PROT SERPL-MCNC: 6.7 G/DL (ref 6–8.5)
RBC # BLD AUTO: 3.84 X10E6/UL (ref 3.77–5.28)
SL AMB EGFR AFRICAN AMERICAN: 77 ML/MIN/1.73
SL AMB EGFR NON AFRICAN AMERICAN: 67 ML/MIN/1.73
SL AMB VLDL CHOLESTEROL CALC: 12 MG/DL (ref 5–40)
SODIUM SERPL-SCNC: 127 MMOL/L (ref 134–144)
TRIGL SERPL-MCNC: 55 MG/DL (ref 0–149)
TSH SERPL DL<=0.005 MIU/L-ACNC: 2.09 UIU/ML (ref 0.45–4.5)
WBC # BLD AUTO: 5.5 X10E3/UL (ref 3.4–10.8)

## 2021-01-07 DIAGNOSIS — E78.2 MIXED HYPERLIPIDEMIA: ICD-10-CM

## 2021-01-07 DIAGNOSIS — I10 ESSENTIAL HYPERTENSION: ICD-10-CM

## 2021-01-07 RX ORDER — LOSARTAN POTASSIUM 100 MG/1
100 TABLET ORAL DAILY
Qty: 90 TABLET | Refills: 4 | Status: SHIPPED | OUTPATIENT
Start: 2021-01-07 | End: 2022-01-18

## 2021-01-07 RX ORDER — ATORVASTATIN CALCIUM 40 MG/1
TABLET, FILM COATED ORAL
Qty: 180 TABLET | Refills: 1 | Status: SHIPPED | OUTPATIENT
Start: 2021-01-07 | End: 2022-07-18 | Stop reason: SDUPTHER

## 2021-01-07 NOTE — TELEPHONE ENCOUNTER
Pt left message, she said we sent a 30 day refill of Losartan but needs a 90 day  Lydia Lala I do not see any refill for this medication in the chart recently   Will T up RX

## 2021-01-14 DIAGNOSIS — I10 ESSENTIAL HYPERTENSION: ICD-10-CM

## 2021-01-14 NOTE — TELEPHONE ENCOUNTER
Out of refills for labetalol 100mg  She filled rx today, and it is a 90 day supply  She sees Mikie Cook for a visit in March and wants to let us know in case we want to refill it again in case

## 2021-01-15 RX ORDER — LABETALOL 100 MG/1
200 TABLET, FILM COATED ORAL EVERY 8 HOURS
Qty: 540 TABLET | Refills: 1 | Status: SHIPPED | OUTPATIENT
Start: 2021-01-15 | End: 2021-09-22

## 2021-03-01 DIAGNOSIS — J30.2 SEASONAL ALLERGIES: ICD-10-CM

## 2021-03-01 RX ORDER — MONTELUKAST SODIUM 10 MG/1
TABLET ORAL
Qty: 90 TABLET | Refills: 1 | Status: SHIPPED | OUTPATIENT
Start: 2021-03-01 | End: 2021-04-09 | Stop reason: SDUPTHER

## 2021-03-02 DIAGNOSIS — D73.5 SPLENIC INFARCTION: ICD-10-CM

## 2021-03-03 RX ORDER — CLOPIDOGREL BISULFATE 75 MG/1
75 TABLET ORAL DAILY
Qty: 90 TABLET | Refills: 1 | Status: SHIPPED | OUTPATIENT
Start: 2021-03-03 | End: 2021-04-09 | Stop reason: SDUPTHER

## 2021-03-23 ENCOUNTER — TELEPHONE (OUTPATIENT)
Dept: FAMILY MEDICINE CLINIC | Facility: CLINIC | Age: 81
End: 2021-03-23

## 2021-03-23 DIAGNOSIS — I25.10 CORONARY ARTERY DISEASE DUE TO LIPID RICH PLAQUE: ICD-10-CM

## 2021-03-23 DIAGNOSIS — R76.8 POSITIVE ANA (ANTINUCLEAR ANTIBODY): ICD-10-CM

## 2021-03-23 DIAGNOSIS — I25.83 CORONARY ARTERY DISEASE DUE TO LIPID RICH PLAQUE: ICD-10-CM

## 2021-03-23 DIAGNOSIS — I10 ESSENTIAL HYPERTENSION: Primary | ICD-10-CM

## 2021-04-06 LAB
ALBUMIN SERPL-MCNC: 4 G/DL (ref 3.7–4.7)
ALBUMIN/GLOB SERPL: 1.4 {RATIO} (ref 1.2–2.2)
ALP SERPL-CCNC: 110 IU/L (ref 39–117)
ALT SERPL-CCNC: 27 IU/L (ref 0–32)
ANA SER QL: NEGATIVE
APPEARANCE UR: CLEAR
AST SERPL-CCNC: 27 IU/L (ref 0–40)
BACTERIA URNS QL MICRO: NORMAL
BASOPHILS # BLD AUTO: 0 X10E3/UL (ref 0–0.2)
BASOPHILS NFR BLD AUTO: 1 %
BILIRUB SERPL-MCNC: 0.5 MG/DL (ref 0–1.2)
BILIRUB UR QL STRIP: NEGATIVE
BUN SERPL-MCNC: 8 MG/DL (ref 8–27)
BUN/CREAT SERPL: 11 (ref 12–28)
CALCIUM SERPL-MCNC: 8.8 MG/DL (ref 8.7–10.3)
CHLORIDE SERPL-SCNC: 92 MMOL/L (ref 96–106)
CHOLEST SERPL-MCNC: 137 MG/DL (ref 100–199)
CO2 SERPL-SCNC: 25 MMOL/L (ref 20–29)
COLOR UR: YELLOW
CREAT SERPL-MCNC: 0.75 MG/DL (ref 0.57–1)
EOSINOPHIL # BLD AUTO: 0.3 X10E3/UL (ref 0–0.4)
EOSINOPHIL NFR BLD AUTO: 6 %
EPI CELLS #/AREA URNS HPF: NORMAL /HPF (ref 0–10)
ERYTHROCYTE [DISTWIDTH] IN BLOOD BY AUTOMATED COUNT: 12.1 % (ref 11.7–15.4)
GLOBULIN SER-MCNC: 2.8 G/DL (ref 1.5–4.5)
GLUCOSE SERPL-MCNC: 98 MG/DL (ref 65–99)
GLUCOSE UR QL: NEGATIVE
HCT VFR BLD AUTO: 35.9 % (ref 34–46.6)
HDLC SERPL-MCNC: 63 MG/DL
HGB BLD-MCNC: 12.1 G/DL (ref 11.1–15.9)
HGB UR QL STRIP: NEGATIVE
IMM GRANULOCYTES # BLD: 0 X10E3/UL (ref 0–0.1)
IMM GRANULOCYTES NFR BLD: 0 %
KETONES UR QL STRIP: NEGATIVE
LDLC SERPL CALC-MCNC: 64 MG/DL (ref 0–99)
LEUKOCYTE ESTERASE UR QL STRIP: NEGATIVE
LYMPHOCYTES # BLD AUTO: 1.1 X10E3/UL (ref 0.7–3.1)
LYMPHOCYTES NFR BLD AUTO: 21 %
MCH RBC QN AUTO: 31.9 PG (ref 26.6–33)
MCHC RBC AUTO-ENTMCNC: 33.7 G/DL (ref 31.5–35.7)
MCV RBC AUTO: 95 FL (ref 79–97)
MICRO URNS: NORMAL
MICRO URNS: NORMAL
MONOCYTES # BLD AUTO: 0.6 X10E3/UL (ref 0.1–0.9)
MONOCYTES NFR BLD AUTO: 11 %
NEUTROPHILS # BLD AUTO: 3.3 X10E3/UL (ref 1.4–7)
NEUTROPHILS NFR BLD AUTO: 61 %
NITRITE UR QL STRIP: NEGATIVE
PH UR STRIP: 7 [PH] (ref 5–7.5)
PLATELET # BLD AUTO: 250 X10E3/UL (ref 150–450)
POTASSIUM SERPL-SCNC: 4.6 MMOL/L (ref 3.5–5.2)
PROT SERPL-MCNC: 6.8 G/DL (ref 6–8.5)
PROT UR QL STRIP: NEGATIVE
RBC # BLD AUTO: 3.79 X10E6/UL (ref 3.77–5.28)
RBC #/AREA URNS HPF: NORMAL /HPF (ref 0–2)
SL AMB EGFR AFRICAN AMERICAN: 88 ML/MIN/1.73
SL AMB EGFR NON AFRICAN AMERICAN: 77 ML/MIN/1.73
SL AMB VLDL CHOLESTEROL CALC: 10 MG/DL (ref 5–40)
SODIUM SERPL-SCNC: 129 MMOL/L (ref 134–144)
SP GR UR: 1.01 (ref 1–1.03)
TRIGL SERPL-MCNC: 45 MG/DL (ref 0–149)
TSH SERPL DL<=0.005 MIU/L-ACNC: 2.66 UIU/ML (ref 0.45–4.5)
UROBILINOGEN UR STRIP-ACNC: 0.2 MG/DL (ref 0.2–1)
WBC # BLD AUTO: 5.4 X10E3/UL (ref 3.4–10.8)
WBC #/AREA URNS HPF: NORMAL /HPF (ref 0–5)

## 2021-04-07 RX ORDER — CLOBETASOL PROPIONATE 0.46 MG/ML
SOLUTION TOPICAL
COMMUNITY
Start: 2021-01-25 | End: 2021-05-19 | Stop reason: SDUPTHER

## 2021-04-09 ENCOUNTER — OFFICE VISIT (OUTPATIENT)
Dept: FAMILY MEDICINE CLINIC | Facility: CLINIC | Age: 81
End: 2021-04-09
Payer: MEDICARE

## 2021-04-09 VITALS
HEIGHT: 69 IN | HEART RATE: 77 BPM | DIASTOLIC BLOOD PRESSURE: 86 MMHG | WEIGHT: 174 LBS | OXYGEN SATURATION: 98 % | SYSTOLIC BLOOD PRESSURE: 164 MMHG | BODY MASS INDEX: 25.77 KG/M2 | TEMPERATURE: 97.2 F | RESPIRATION RATE: 16 BRPM

## 2021-04-09 DIAGNOSIS — I70.213 ATHEROSCLER OF NATIVE ARTERY OF BOTH LEGS WITH INTERMIT CLAUDICATION (HCC): ICD-10-CM

## 2021-04-09 DIAGNOSIS — I10 ESSENTIAL HYPERTENSION: ICD-10-CM

## 2021-04-09 DIAGNOSIS — J30.2 SEASONAL ALLERGIES: ICD-10-CM

## 2021-04-09 DIAGNOSIS — I74.10 SHAGGY AORTA SYNDROME (HCC): ICD-10-CM

## 2021-04-09 DIAGNOSIS — D73.5 SPLENIC INFARCTION: ICD-10-CM

## 2021-04-09 DIAGNOSIS — M35.09 SJOGREN'S SYNDROME WITH OTHER ORGAN INVOLVEMENT (HCC): Primary | ICD-10-CM

## 2021-04-09 DIAGNOSIS — E04.9 ENLARGED THYROID: ICD-10-CM

## 2021-04-09 PROCEDURE — 99214 OFFICE O/P EST MOD 30 MIN: CPT | Performed by: NURSE PRACTITIONER

## 2021-04-09 PROCEDURE — 1123F ACP DISCUSS/DSCN MKR DOCD: CPT | Performed by: NURSE PRACTITIONER

## 2021-04-09 PROCEDURE — G0439 PPPS, SUBSEQ VISIT: HCPCS | Performed by: NURSE PRACTITIONER

## 2021-04-09 RX ORDER — MONTELUKAST SODIUM 10 MG/1
10 TABLET ORAL
Qty: 90 TABLET | Refills: 0 | Status: SHIPPED | OUTPATIENT
Start: 2021-04-09 | End: 2022-01-04 | Stop reason: SDUPTHER

## 2021-04-09 RX ORDER — OMEGA-3-ACID ETHYL ESTERS 1 G/1
2 CAPSULE, LIQUID FILLED ORAL 2 TIMES DAILY
Qty: 360 CAPSULE | Refills: 1 | Status: SHIPPED | OUTPATIENT
Start: 2021-04-09 | End: 2021-09-17

## 2021-04-09 RX ORDER — CLOPIDOGREL BISULFATE 75 MG/1
75 TABLET ORAL DAILY
Qty: 90 TABLET | Refills: 1 | Status: SHIPPED | OUTPATIENT
Start: 2021-04-09 | End: 2022-01-19

## 2021-04-09 NOTE — PROGRESS NOTES
Assessment and Plan:   Presents in office for medicare wellness   Problem List Items Addressed This Visit        Cardiovascular and Mediastinum    Atheroscler 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 ([facility] [address] )      Other Visit Diagnoses     Sjogren's syndrome with other organ involvement ([facility] [address] )    -  Primary    Relevant Orders    Ambulatory referral to Rheumatology    Comprehensive metabolic panel    CBC and differential    TSH, 3rd generation    UA (URINE) with reflex to Scope    PTH, intact    Splenic infarction        Relevant Medications    clopidogrel (PLAVIX) 75 mg tablet    Seasonal allergies        Relevant Medications    montelukast (SINGULAIR) 10 mg tablet    omega-3-acid ethyl esters (Lovaza) 1 g capsule    Artificial Saliva (Biotene Dry Mouth) LOZG    Other Relevant Orders    Comprehensive metabolic panel    CBC and differential    TSH, 3rd generation    UA (URINE) with reflex to Scope    PTH, intact    Enlarged thyroid        Relevant Orders    US head neck soft tissue    Comprehensive metabolic panel    CBC and differential    TSH, 3rd generation    UA (URINE) with reflex to Scope    PTH, intact        BMI Counseling: Body mass index is 25 7 kg/m²  The BMI is above normal  Nutrition recommendations include decreasing portion sizes, encouraging healthy choices of fruits and vegetables, decreasing fast food intake, consuming healthier snacks, limiting drinks that contain sugar, moderation in carbohydrate intake, increasing intake of lean protein, reducing intake of saturated and trans fat and reducing intake of cholesterol  Exercise recommendations include vigorous physical activity 75 minutes/week, exercising 3-5 times per week and strength training exercises  No pharmacotherapy was ordered  Patient referred to PCP due to patient being overweight  Depression Screening and Follow-up Plan: Patient's depression screening was positive with a PHQ-2 score of 0   Clincally patient does not have depression  No treatment is required  Falls Plan of Care: Recommended assistive device to help with gait and balance  Medications that increase falls were reviewed  Assessed feet and footwear  Vitamin D supplementation was recommended  Home safety education provided  Preventive health issues were discussed with patient, and age appropriate screening tests were ordered as noted in patient's After Visit Summary  Personalized health advice and appropriate referrals for health education or preventive services given if needed, as noted in patient's After Visit Summary       History of Present Illness:     Patient presents for Medicare Annual Wellness visit    Patient Care Team:  Ana Laura landry PCP - General (Nurse Practitioner)     Problem List:     Patient Active Problem List   Diagnosis    Dysphonia    Pharyngoesophageal dysphagia    Seasonal allergic rhinitis    History of tobacco use    Muscle tension dysphonia    Glottic insufficiency    Paralysis of left vocal fold    History of carotid endarterectomy    TULIO positive    Essential hypertension    Hyponatremia    Mixed hyperlipidemia    Varicose veins of bilateral lower extremities with pain    Vitamin D deficiency    Vocal cord paresis    Atheroscler 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 ([address] )      Past Medical and Surgical History:     Past Medical History:   Diagnosis Date    Allergic rhinitis     Anxiety disorder     Arthritis     Dizziness     Dry eye syndrome     Essential hypertension, malignant     Pure hypercholesterolemia     Splenic infarction      Past Surgical History:   Procedure Laterality Date    CARDIAC SURGERY  2019    Blocked Carotid Artery    COSMETIC SURGERY      Scalp with 150 stitches    EYE SURGERY      SINUS SURGERY      THROAT SURGERY  2000    Removal of fishbone      Family History:     Family History   Problem Relation Age of Onset    Heart disease Mother    Rivas Other Sister         asbestos poisoning    Other Maternal Grandfather         cardiomegaly    Cancer Maternal Aunt         ovarian      Social History:        Social History     Socioeconomic History    Marital status: Single     Spouse name: None    Number of children: None    Years of education: None    Highest education level: None   Occupational History    None   Social Needs    Financial resource strain: None    Food insecurity     Worry: None     Inability: None    Transportation needs     Medical: None     Non-medical: None   Tobacco Use    Smoking status: Former Smoker     Packs/day: 0 50     Years: 25 00     Pack years: 12 50     Types: Cigarettes     Quit date: 2017     Years since quittin 2    Smokeless tobacco: Never Used   Substance and Sexual Activity    Alcohol use: Yes     Frequency: Monthly or less     Drinks per session: 1 or 2     Comment: Socially    Drug use: Never    Sexual activity: None   Lifestyle    Physical activity     Days per week: None     Minutes per session: None    Stress: None   Relationships    Social connections     Talks on phone: None     Gets together: None     Attends Dru[employer] service: None     Active member of club or organization: None     Attends meetings of clubs or organizations: None     Relationship status: None    Intimate partner violence     Fear of current or ex partner: None     Emotionally abused: None     Physically abused: None     Forced sexual activity: None   Other Topics Concern    None   Social History Narrative    · Most recent tobacco use screenin2019      · Do you currently or have you served in the [employer]:   No      · Were you activated, into active duty, as a member of the [employer] or as a Reservist:   No      Medications and Allergies:     Current Outpatient Medications   Medication Sig Dispense Refill    Aspirin Low Dose 81 MG EC tablet Take 1 tablet (81 mg total) by mouth daily 90 tablet 1    atorvastatin (LIPITOR) 40 mg tablet TAKE 2 TABLETS BY MOUTH EVERY  tablet 1    B Complex Vitamins (B COMPLEX 1 PO) Take 2 tablets by mouth daily Each tablet is 40mg      clobetasol (TEMOVATE) 0 05 % external solution APPLY TOPICALLY DAILY APPLY TOPICALLY TO SCALP ONCE DAILY   clopidogrel (PLAVIX) 75 mg tablet Take 1 tablet (75 mg total) by mouth daily 90 tablet 1    cycloSPORINE (RESTASIS) 0 05 % ophthalmic emulsion Apply 1 drop to eye 2 (two) times a day      fluticasone ([employer] Fluticasone Propionate) 50 mcg/act nasal spray Spray 1 spray into each nostril every day 9 9 mL 3    fluticasone (VERAMYST) 27 5 MCG/SPRAY nasal spray 2 sprays into each nostril daily      furosemide (LASIX) 20 mg tablet Take 1 tablet (20 mg total) by mouth 2 (two) times a day (Patient taking differently: Take 20 mg by mouth 2 (two) times a day 1/2 tablet BID) 180 tablet 1    labetalol (NORMODYNE) 100 mg tablet Take 2 tablets (200 mg total) by mouth every 8 (eight) hours 540 tablet 1    latanoprost (Xalatan) 0 005 % ophthalmic solution 2 drops each eye  in PM      losartan (COZAAR) 100 MG tablet Take 1 tablet (100 mg total) by mouth daily 90 tablet 4    montelukast (SINGULAIR) 10 mg tablet Take 1 tablet (10 mg total) by mouth daily at bedtime 90 tablet 0    multivitamin (THERAGRAN) TABS Take 1 tablet by mouth daily      olopatadine HCl (PATADAY) 0 2 % opth drops Apply 1 drop to eye 2 (two) times a day      Artificial Saliva (Biotene Dry Mouth) LOZG Apply 1 lozenge to the mouth or throat 2 (two) times a day 180 lozenge 1    omega-3-acid ethyl esters (Lovaza) 1 g capsule Take 2 capsules (2 g total) by mouth 2 (two) times a day 360 capsule 1     No current facility-administered medications for this visit        Allergies   Allergen Reactions    Potassium Chloride Other (See Comments)    Xanax [Alprazolam]       Immunizations:     Immunization History   Administered Date(s) Administered    INFLUENZA 11/01/2018, 11/20/2018    Influenza Split High Dose Preservative Free IM 10/30/2017, 11/01/2019, 09/13/2020    Influenza, high dose seasonal 0 7 mL 09/13/2020    Influenza, seasonal, injectable 12/20/2000    Pneumococcal Conjugate 13-Valent 12/05/2018    Pneumococcal Polysaccharide PPV23 12/01/2018    SARS-CoV-2 / COVID-19 mRNA IM (Stephen Esparza) 02/05/2021, 03/05/2021      Health Maintenance: There are no preventive care reminders to display for this patient  Topic Date Due    DTaP,Tdap,and Td Vaccines (1 - Tdap) Never done      Medicare Health Risk Assessment:     BP (!) 178/90 (BP Location: Left arm, Patient Position: Sitting, Cuff Size: Standard)   Pulse 77   Temp (!) 97 2 °F (36 2 °C) (Temporal)   Resp 16   Ht 5' 9" (1 753 m)   Wt 78 9 kg (174 lb)   SpO2 98%   BMI 25 70 kg/m²      Sadia Robledo is here for her Subsequent Wellness visit  Health Risk Assessment:   Patient rates overall health as very good  Patient feels that their physical health rating is same  Patient is very satisfied with their life  Eyesight was rated as slightly worse  Hearing was rated as same  Patient feels that their emotional and mental health rating is same  Patients states they are never, rarely angry  Patient states they are often unusually tired/fatigued  Pain experienced in the last 7 days has been none  Patient states that she has experienced no weight loss or gain in last 6 months  Depression Screening:   PHQ-2 Score: 0      Fall Risk Screening: In the past year, patient has experienced: no history of falling in past year      Urinary Incontinence Screening:   Patient has not leaked urine accidently in the last six months  Home Safety:  Patient does not have trouble with stairs inside or outside of their home  Patient has working smoke alarms and has working carbon monoxide detector  Home safety hazards include: none  Nutrition:   Current diet is Regular       Medications:   Patient is not currently taking any over-the-counter supplements  Patient is able to manage medications  Activities of Daily Living (ADLs)/Instrumental Activities of Daily Living (IADLs):   Walk and transfer into and out of bed and chair?: Yes  Dress and groom yourself?: Yes    Bathe or shower yourself?: Yes    Feed yourself? Yes  Do your laundry/housekeeping?: Yes  Manage your money, pay your bills and track your expenses?: Yes  Make your own meals?: Yes    Do your own shopping?: Yes    Previous Hospitalizations:   Any hospitalizations or ED visits within the last 12 months?: No      Advance Care Planning:   Living will: No    Advanced directive: Yes    Advanced directive counseling given: Yes    Five wishes given: Yes      PREVENTIVE SCREENINGS      Cardiovascular Screening:    General: Screening Not Indicated, History Lipid Disorder, Risks and Benefits Discussed and Screening Current      Diabetes Screening:     General: Screening Current      Colorectal Cancer Screening:     General: Risks and Benefits Discussed    Due for: Cologuard      Breast Cancer Screening:     General: Risks and Benefits Discussed    Due for: Mammogram        Cervical Cancer Screening:    General: Screening Not Indicated      Osteoporosis Screening:    General: Risks and Benefits Discussed    Due for: Bone Density Ultrasound      Lung Cancer Screening:     General: Screening Not Indicated and Risks and Benefits Discussed      Hepatitis C Screening:    General: Risks and Benefits Discussed    Screening, Brief Intervention, and Referral to Treatment (SBIRT)    Screening  Typical number of drinks in a day: 0  Typical number of drinks in a week: 0  Interpretation: Low risk drinking behavior      Single Item Drug Screening:  How often have you used an illegal drug (including marijuana) or a prescription medication for non-medical reasons in the past year? never    Single Item Drug Screen Score: 0  Interpretation: Negative screen for possible drug use disorder    Other Counseling Topics:   Car/seat belt/driving safety, skin self-exam, sunscreen and calcium and vitamin D intake and regular weightbearing exercise         KRISTINA Castro

## 2021-04-09 NOTE — PATIENT INSTRUCTIONS
Medicare Preventive Visit Patient Instructions  Thank you for completing your Welcome to Medicare Visit or Medicare Annual Wellness Visit today  Your next wellness visit will be due in one year (4/10/2022)  The screening/preventive services that you may require over the next 5-10 years are detailed below  Some tests may not apply to you based off risk factors and/or age  Screening tests ordered at today's visit but not completed yet may show as past due  Also, please note that scanned in results may not display below  Preventive Screenings:  Service Recommendations Previous Testing/Comments   Colorectal Cancer Screening  * Colonoscopy    * Fecal Occult Blood Test (FOBT)/Fecal Immunochemical Test (FIT)  * Fecal DNA/Cologuard Test  * Flexible Sigmoidoscopy Age: 54-65 years old   Colonoscopy: every 10 years (may be performed more frequently if at higher risk)  OR  FOBT/FIT: every 1 year  OR  Cologuard: every 3 years  OR  Sigmoidoscopy: every 5 years  Screening may be recommended earlier than age 48 if at higher risk for colorectal cancer  Also, an individualized decision between you and your healthcare provider will decide whether screening between the ages of 74-80 would be appropriate  Colonoscopy: Not on file  FOBT/FIT: Not on file  Cologuard: Not on file  Sigmoidoscopy: Not on file          Breast Cancer Screening Age: 36 years old  Frequency: every 1-2 years  Not required if history of left and right mastectomy Mammogram: Not on file        Cervical Cancer Screening Between the ages of 21-29, pap smear recommended once every 3 years  Between the ages of 33-67, can perform pap smear with HPV co-testing every 5 years     Recommendations may differ for women with a history of total hysterectomy, cervical cancer, or abnormal pap smears in past  Pap Smear: Not on file    Screening Not Indicated   Hepatitis C Screening Once for adults born between [facility]  More frequently in patients at high risk for Hepatitis C Hep C Antibody: Not on file        Diabetes Screening 1-2 times per year if you're at risk for diabetes or have pre-diabetes Fasting glucose: No results in last 5 years   A1C: No results in last 5 years    Screening Current   Cholesterol Screening Once every 5 years if you don't have a lipid disorder  May order more often based on risk factors  Lipid panel: 04/05/2021    Screening Not Indicated  History Lipid Disorder     Other Preventive Screenings Covered by Medicare:  1  Abdominal Aortic Aneurysm (AAA) Screening: covered once if your at risk  You're considered to be at risk if you have a family history of AAA  2  Lung Cancer Screening: covers low dose CT scan once per year if you meet all of the following conditions: (1) Age 50-69; (2) No signs or symptoms of lung cancer; (3) Current smoker or have quit smoking within the last 15 years; (4) You have a tobacco smoking history of at least 30 pack years (packs per day multiplied by number of years you smoked); (5) You get a written order from a healthcare provider  3  Glaucoma Screening: covered annually if you're considered high risk: (1) You have diabetes OR (2) Family history of glaucoma OR (3)  aged 48 and older OR (3)  American aged 72 and older  3  Osteoporosis Screening: covered every 2 years if you meet one of the following conditions: (1) You're estrogen deficient and at risk for osteoporosis based off medical history and other findings; (2) Have a vertebral abnormality; (3) On glucocorticoid therapy for more than 3 months; (4) Have primary hyperparathyroidism; (5) On osteoporosis medications and need to assess response to drug therapy  · Last bone density test (DXA Scan): Not on file  5  HIV Screening: covered annually if you're between the age of 12-76  Also covered annually if you are younger than 13 and older than 72 with risk factors for HIV infection   For pregnant patients, it is covered up to 3 times per pregnancy  Immunizations:  Immunization Recommendations   Influenza Vaccine Annual influenza vaccination during flu season is recommended for all persons aged >= 6 months who do not have contraindications   Pneumococcal Vaccine (Prevnar and Pneumovax)  * Prevnar = PCV13  * Pneumovax = PPSV23   Adults 25-60 years old: 1-3 doses may be recommended based on certain risk factors  Adults 72 years old: Prevnar (PCV13) vaccine recommended followed by Pneumovax (PPSV23) vaccine  If already received PPSV23 since turning 65, then PCV13 recommended at least one year after PPSV23 dose  Hepatitis B Vaccine 3 dose series if at intermediate or high risk (ex: diabetes, end stage renal disease, liver disease)   Tetanus (Td) Vaccine - COST NOT COVERED BY MEDICARE PART B Following completion of primary series, a booster dose should be given every 10 years to maintain immunity against tetanus  Td may also be given as tetanus wound prophylaxis  Tdap Vaccine - COST NOT COVERED BY MEDICARE PART B Recommended at least once for all adults  For pregnant patients, recommended with each pregnancy  Shingles Vaccine (Shingrix) - COST NOT COVERED BY MEDICARE PART B  2 shot series recommended in those aged 48 and above     Health Maintenance Due:  There are no preventive care reminders to display for this patient  Immunizations Due:      Topic Date Due    DTaP,Tdap,and Td Vaccines (1 - Tdap) Never done     Advance Directives   What are advance directives? Advance directives are legal documents that state your wishes and plans for medical care  These plans are made ahead of time in case you lose your ability to make decisions for yourself  Advance directives can apply to any medical decision, such as the treatments you want, and if you want to donate organs  What are the types of advance directives? There are many types of advance directives, and each state has rules about how to use them   You may choose a combination of any of the following:  · Living will: This is a written record of the treatment you want  You can also choose which treatments you do not want, which to limit, and which to stop at a certain time  This includes surgery, medicine, IV fluid, and tube feedings  · Durable power of  for healthcare [facility]): This is a written record that states who you want to make healthcare choices for you when you are unable to make them for yourself  This person, called a proxy, is usually a family member or a friend  You may choose more than 1 proxy  · Do not resuscitate (DNR) order:  A DNR order is used in case your heart stops beating or you stop breathing  It is a request not to have certain forms of treatment, such as CPR  A DNR order may be included in other types of advance directives  · Medical directive: This covers the care that you want if you are in a coma, near death, or unable to make decisions for yourself  You can list the treatments you want for each condition  Treatment may include pain medicine, surgery, blood transfusions, dialysis, IV or tube feedings, and a ventilator (breathing machine)  · Values history: This document has questions about your views, beliefs, and how you feel and think about life  This information can help others choose the care that you would choose  Why are advance directives important? An advance directive helps you control your care  Although spoken wishes may be used, it is better to have your wishes written down  Spoken wishes can be misunderstood, or not followed  Treatments may be given even if you do not want them  An advance directive may make it easier for your family to make difficult choices about your care  Weight Management   Why it is important to manage your weight:  Being overweight increases your risk of health conditions such as heart disease, high blood pressure, type 2 diabetes, and certain types of cancer   It can also increase your risk for osteoarthritis, sleep apnea, and other respiratory problems  Aim for a slow, steady weight loss  Even a small amount of weight loss can lower your risk of health problems  How to lose weight safely:  A safe and healthy way to lose weight is to eat fewer calories and get regular exercise  You can lose up about 1 pound a week by decreasing the number of calories you eat by 500 calories each day  Healthy meal plan for weight management:  A healthy meal plan includes a variety of foods, contains fewer calories, and helps you stay healthy  A healthy meal plan includes the following:  · Eat whole-grain foods more often  A healthy meal plan should contain fiber  Fiber is the part of grains, fruits, and vegetables that is not broken down by your body  Whole-grain foods are healthy and provide extra fiber in your diet  Some examples of whole-grain foods are whole-wheat breads and pastas, oatmeal, brown rice, and bulgur  · Eat a variety of vegetables every day  Include dark, leafy greens such as spinach, kale, mukesh greens, and mustard greens  Eat yellow and orange vegetables such as carrots, sweet potatoes, and winter squash  · Eat a variety of fruits every day  Choose fresh or canned fruit (canned in its own juice or light syrup) instead of juice  Fruit juice has very little or no fiber  · Eat low-fat dairy foods  Drink fat-free (skim) milk or 1% milk  Eat fat-free yogurt and low-fat cottage cheese  Try low-fat cheeses such as mozzarella and other reduced-fat cheeses  · Choose meat and other protein foods that are low in fat  Choose beans or other legumes such as split peas or lentils  Choose fish, skinless poultry (chicken or turkey), or lean cuts of red meat (beef or pork)  Before you cook meat or poultry, cut off any visible fat  · Use less fat and oil  Try baking foods instead of frying them  Add less fat, such as margarine, sour cream, regular salad dressing and mayonnaise to foods  Eat fewer high-fat foods   Some examples of high-fat foods include french fries, doughnuts, ice cream, and cakes  · Eat fewer sweets  Limit foods and drinks that are high in sugar  This includes candy, cookies, regular soda, and sweetened drinks  Exercise:  Exercise at least 30 minutes per day on most days of the week  Some examples of exercise include walking, biking, dancing, and swimming  You can also fit in more physical activity by taking the stairs instead of the elevator or parking farther away from stores  Ask your healthcare provider about the best exercise plan for you  © Copyright [employer] 2018 Information is for End User's use only and may not be sold, redistributed or otherwise used for commercial purposes  All illustrations and images included in CareNotes® are the copyrighted property of [employer] D A M [employer]  or [employer] Visit for Adults   AMBULATORY CARE:   A wellness visit  is when you see your healthcare provider to get screened for health problems  Your healthcare provider will also give you advice on how to stay healthy  Write down your questions so you remember to ask them  Ask your healthcare provider how often you should have a wellness visit  What happens at a wellness visit:  Your healthcare provider will ask about your health, and your family history of health problems  This includes high blood pressure, heart disease, and cancer  He or she will ask if you have symptoms that concern you, if you smoke, and about your mood  You may also be asked about your intake of medicines, supplements, food, and alcohol  Any of the following may be done:  · Your weight  will be checked  Your height may also be checked so your body mass index (BMI) can be calculated  Your BMI shows if you are at a healthy weight  · Your blood pressure  and heart rate will be checked  Your temperature may also be checked  · Blood and urine tests  may be done  Blood tests may be done to check your cholesterol levels   Abnormal cholesterol levels increase your risk for heart disease and stroke  You may also need a blood or urine test to check for diabetes if you are at increased risk  Urine tests may be done to look for signs of an infection or kidney disease  · A physical exam  includes checking your heartbeat and lungs with a stethoscope  Your healthcare provider may also check your skin to look for sun damage  · Screening tests  may be recommended  A screening test is done to check for diseases that may not cause symptoms  The screening tests you may need depend on your age, gender, family history, and lifestyle habits  For example, colorectal screening may be recommended if you are 48years old or older  Screening tests you need if you are a woman:   · A Pap smear  is used to screen for cervical cancer  Pap smears are usually done every 3 to 5 years depending on your age  You may need them more often if you have had abnormal Pap smear test results in the past  Ask your healthcare provider how often you should have a Pap smear  · A mammogram  is an x-ray of your breasts to screen for breast cancer  Experts recommend mammograms every 2 years starting at age 48 years  You may need a mammogram at age 52 years or younger if you have an increased risk for breast cancer  Talk to your healthcare provider about when you should start having mammograms and how often you need them  Vaccines you may need:   · Get an influenza vaccine  every year  The influenza vaccine protects you from the flu  Several types of viruses cause the flu  The viruses change over time, so new vaccines are made each year  · Get a tetanus-diphtheria (Td) booster vaccine  every 10 years  This vaccine protects you against tetanus and diphtheria  Tetanus is a severe infection that may cause painful muscle spasms and lockjaw  Diphtheria is a severe bacterial infection that causes a thick covering in the back of your mouth and throat      · Get a human papillomavirus (HPV) vaccine  if you are female and aged 23 to 32 or male 23 to 24 and never received it  This vaccine protects you from HPV infection  HPV is the most common infection spread by sexual contact  HPV may also cause vaginal, penile, and anal cancers  · Get a pneumococcal vaccine  if you are aged 72 years or older  The pneumococcal vaccine is an injection given to protect you from pneumococcal disease  Pneumococcal disease is an infection caused by pneumococcal bacteria  The infection may cause pneumonia, meningitis, or an ear infection  · Get a shingles vaccine  if you are 60 or older, even if you have had shingles before  The shingles vaccine is an injection to protect you from the varicella-zoster virus  This is the same virus that causes chickenpox  Shingles is a painful rash that develops in people who had chickenpox or have been exposed to the virus  How to eat healthy:  My Plate is a model for planning healthy meals  It shows the types and amounts of foods that should go on your plate  Fruits and vegetables make up about half of your plate, and grains and protein make up the other half  A serving of dairy is included on the side of your plate  The amount of calories and serving sizes you need depends on your age, gender, weight, and height  Examples of healthy foods are listed below:  · Eat a variety of vegetables  such as dark green, red, and orange vegetables  You can also include canned vegetables low in sodium (salt) and frozen vegetables without added butter or sauces  · Eat a variety of fresh fruits , canned fruit in 100% juice, frozen fruit, and dried fruit  · Include whole grains  At least half of the grains you eat should be whole grains  Examples include whole-wheat bread, wheat pasta, brown rice, and whole-grain cereals such as oatmeal     · Eat a variety of protein foods such as seafood (fish and shellfish), lean meat, and poultry without skin (turkey and chicken)  Examples of lean meats include pork leg, shoulder, or tenderloin, and beef round, sirloin, tenderloin, and extra lean ground beef  Other protein foods include eggs and egg substitutes, beans, peas, soy products, nuts, and seeds  · Choose low-fat dairy products such as skim or 1% milk or low-fat yogurt, cheese, and cottage cheese  · Limit unhealthy fats  such as butter, hard margarine, and shortening  Exercise:  Exercise at least 30 minutes per day on most days of the week  Some examples of exercise include walking, biking, dancing, and swimming  You can also fit in more physical activity by taking the stairs instead of the elevator or parking farther away from stores  Include muscle strengthening activities 2 days each week  Regular exercise provides many health benefits  It helps you manage your weight, and decreases your risk for type 2 diabetes, heart disease, stroke, and high blood pressure  Exercise can also help improve your mood  Ask your healthcare provider about the best exercise plan for you  General health and safety guidelines:   · Do not smoke  Nicotine and other chemicals in cigarettes and cigars can cause lung damage  Ask your healthcare provider for information if you currently smoke and need help to quit  E-cigarettes or smokeless tobacco still contain nicotine  Talk to your healthcare provider before you use these products  · Limit alcohol  A drink of alcohol is 12 ounces of beer, 5 ounces of wine, or 1½ ounces of liquor  · Lose weight, if needed  Being overweight increases your risk of certain health conditions  These include heart disease, high blood pressure, type 2 diabetes, and certain types of cancer  · Protect your skin  Do not sunbathe or use tanning beds  Use sunscreen with a SPF 15 or higher  Apply sunscreen at least 15 minutes before you go outside  Reapply sunscreen every 2 hours  Wear protective clothing, hats, and sunglasses when you are outside      · Drive safely  Always wear your seatbelt  Make sure everyone in your car wears a seatbelt  A seatbelt can save your life if you are in an accident  Do not use your cell phone when you are driving  This could distract you and cause an accident  Pull over if you need to make a call or send a text message  · Practice safe sex  Use latex condoms if are sexually active and have more than one partner  Your healthcare provider may recommend screening tests for sexually transmitted infections (STIs)  · Wear helmets, lifejackets, and protective gear  Always wear a helmet when you ride a bike or motorcycle, go skiing, or play sports that could cause a head injury  Wear protective equipment when you play sports  Wear a lifejacket when you are on a boat or doing water sports  © Copyright [address] Information is for End User's use only and may not be sold, redistributed or otherwise used for commercial purposes  All illustrations and images included in CareNotes® are the copyrighted property of [employer] M , Inc  or [facility] Yeyo Aggarwal   The above information is an  only  It is not intended as medical advice for individual conditions or treatments  Talk to your doctor, nurse or pharmacist before following any medical regimen to see if it is safe and effective for you

## 2021-04-18 PROBLEM — I74.10: Status: ACTIVE | Noted: 2021-04-18

## 2021-04-18 RX ORDER — FUROSEMIDE 20 MG/1
20 TABLET ORAL DAILY
Qty: 90 TABLET | Refills: 0 | Status: SHIPPED | OUTPATIENT
Start: 2021-04-18 | End: 2021-09-17

## 2021-04-18 RX ORDER — FUROSEMIDE 20 MG/1
10 TABLET ORAL DAILY
Qty: 45 TABLET | Refills: 1 | Status: SHIPPED | OUTPATIENT
Start: 2021-04-18 | End: 2021-04-18

## 2021-04-18 NOTE — PROGRESS NOTES
Assessment/Plan:    Presents in office for 4 month follow up and in need for medicare wellness   HTN - does have some elevations with stress and white coat syndrome however mostly stable Under care of Cardiolody   CAD - stable - continues plavix   Hyperlipidemia - stable continues statin  Hyponatremia - denies any issues - asymptomatic has been a chronic issue on and off was seen by renal in the past  She has follow up with them --> she is to discuss Na intake and supplements   Voice issues s/p  improvig with therapy  But fully recovered she is still having issues swallowing   Denies any abdominal issues   We are monitoring aorta - under care of  Cardiology   Currently on half a tablet of water pill daily will follow up with Cardiology in 1 week   Denies any issues   BP is still elevated - she will discuss with Cardiology and let me know plan   Sees Dr Rody Post in 1 week and Nephrology   Will not close chart until review notes and see what is the plan   UPDATED: Diuretic was increased from 1/2 tablet 20mg dose to 1 tablet daily as per patient , and as per Nephrology Na tablets have been discontinued  With fluid intake reduction and adjustment of diet          Problem List Items Addressed This Visit        Cardiovascular and Mediastinum    Essential hypertension    Relevant Medications    furosemide (LASIX) 20 mg tablet    Atheroscler 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 ([facility] [address] )    Shaggy aorta syndrome ([facility] [address] )      Other Visit Diagnoses     Sjogren's syndrome with other organ involvement ([facility] [address] )    -  Primary    Relevant Orders    Ambulatory referral to Rheumatology    Comprehensive metabolic panel    CBC and differential    TSH, 3rd generation    UA (URINE) with reflex to Scope    PTH, intact    Splenic infarction        Relevant Medications    clopidogrel (PLAVIX) 75 mg tablet    Seasonal allergies        Relevant Medications    montelukast (SINGULAIR) 10 mg tablet    omega-3-acid ethyl esters (Lovaza) 1 g capsule    Artificial Saliva (Biotene Dry Mouth) LOZG    Other Relevant Orders    Comprehensive metabolic panel    CBC and differential    TSH, 3rd generation    UA (URINE) with reflex to Scope    PTH, intact    Enlarged thyroid        Relevant Orders    US head neck soft tissue    Comprehensive metabolic panel    CBC and differential    TSH, 3rd generation    UA (URINE) with reflex to Scope    PTH, intact            Subjective:      Patient ID: Catarina Badillo is a 80 y o  female  Presents in office for 4 month follow up   HTN - does have some elevations with stress and white coat syndrome however mostly stable Under care of Cardiolody   CAD - stable - continues plavix   Hyperlipidemia - stable continues statin  Hyponatremia - denies any issues - asymptomatic has been a chronic issue on and off was seen by renal in the past  She has follow up with them --> she is to discuss Na intake and supplements   Voice issues s/p  improvig with therapy  But fully recovered she is still having issues swallowing   Denies any abdominal issues   We are monitoring aorta - under care of  Cardiology   Currently on half a tablet of water pill daily will follow up with Cardiology in 1 week   Denies any issues   BP is still elevated - she will discuss with Cardiology and let me know plan   Sees Dr Viviane Neal       The following portions of the patient's history were reviewed and updated as appropriate:   Past Medical History:  She has a past medical history of Allergic rhinitis, Anxiety disorder, Arthritis, Dizziness, Dry eye syndrome, Essential hypertension, malignant, Pure hypercholesterolemia, and Splenic infarction  ,  _______________________________________________________________________  Medical Problems:  does not have any pertinent problems on file ,  _______________________________________________________________________  Past Surgical History:   has a past surgical history that includes Sinus surgery; Cardiac surgery (2019); Throat surgery (2000); Eye surgery; and Cosmetic surgery  ,  _______________________________________________________________________  Family History:  family history includes Cancer in her maternal aunt; Heart disease in her mother; Other in her maternal grandfather and sister  ,  _______________________________________________________________________  Social History:   reports that she quit smoking about 4 years ago  Her smoking use included cigarettes  She has a 12 50 pack-year smoking history  She has never used smokeless tobacco  She reports current alcohol use  She reports that she does not use drugs  ,  _______________________________________________________________________  Allergies:  is allergic to potassium chloride and xanax [alprazolam]     _______________________________________________________________________  Current Outpatient Medications   Medication Sig Dispense Refill    Aspirin Low Dose 81 MG EC tablet Take 1 tablet (81 mg total) by mouth daily 90 tablet 1    atorvastatin (LIPITOR) 40 mg tablet TAKE 2 TABLETS BY MOUTH EVERY  tablet 1    B Complex Vitamins (B COMPLEX 1 PO) Take 2 tablets by mouth daily Each tablet is 40mg      clobetasol (TEMOVATE) 0 05 % external solution APPLY TOPICALLY DAILY APPLY TOPICALLY TO SCALP ONCE DAILY        clopidogrel (PLAVIX) 75 mg tablet Take 1 tablet (75 mg total) by mouth daily 90 tablet 1    cycloSPORINE (RESTASIS) 0 05 % ophthalmic emulsion Apply 1 drop to eye 2 (two) times a day      fluticasone ([employer] Fluticasone Propionate) 50 mcg/act nasal spray Spray 1 spray into each nostril every day 9 9 mL 3    fluticasone (VERAMYST) 27 5 MCG/SPRAY nasal spray 2 sprays into each nostril daily      furosemide (LASIX) 20 mg tablet Take 0 5 tablets (10 mg total) by mouth daily 45 tablet 1    labetalol (NORMODYNE) 100 mg tablet Take 2 tablets (200 mg total) by mouth every 8 (eight) hours 540 tablet 1    latanoprost (Xalatan) 0 005 % ophthalmic solution 2 drops each eye  in PM      losartan (COZAAR) 100 MG tablet Take 1 tablet (100 mg total) by mouth daily 90 tablet 4    montelukast (SINGULAIR) 10 mg tablet Take 1 tablet (10 mg total) by mouth daily at bedtime 90 tablet 0    multivitamin (THERAGRAN) TABS Take 1 tablet by mouth daily      olopatadine HCl (PATADAY) 0 2 % opth drops Apply 1 drop to eye 2 (two) times a day      Artificial Saliva (Biotene Dry Mouth) LOZG Apply 1 lozenge to the mouth or throat 2 (two) times a day 180 lozenge 1    omega-3-acid ethyl esters (Lovaza) 1 g capsule Take 2 capsules (2 g total) by mouth 2 (two) times a day 360 capsule 1     No current facility-administered medications for this visit       _______________________________________________________________________  Review of Systems   Constitutional: Positive for fatigue  Negative for chills and fever  HENT: Positive for congestion  Negative for postnasal drip and sore throat  Swallowing issues    Eyes: Negative for pain, discharge and itching  Respiratory: Negative for cough and shortness of breath  Cardiovascular: Negative for chest pain and leg swelling  HTN - labetalol was increased to three times a day   Under care of Cardiology   hyperlipidemia    in the past    Gastrointestinal: Negative for abdominal distention and abdominal pain  Endocrine: Positive for cold intolerance  Genitourinary: Negative for difficulty urinating, dysuria and flank pain  History of Na being low under care of Nephrology    Musculoskeletal: Positive for myalgias  Skin: Positive for rash (still has some scalp erythema but much better )  Allergic/Immunologic: Positive for environmental allergies  Neurological: Negative for dizziness, weakness and headaches  Hematological: Negative for adenopathy  Psychiatric/Behavioral: Negative for sleep disturbance and suicidal ideas  The patient is not nervous/anxious            Objective:  Vitals:    21 1244   BP: 164/86   BP Location: Left arm   Patient Position: Sitting   Cuff Size: Standard   Pulse: 77   Resp: 16   Temp: (!) 97 2 °F (36 2 °C)   TempSrc: Temporal   SpO2: 98%   Weight: 78 9 kg (174 lb)   Height: 5' 9" (1 753 m)     Body mass index is 25 7 kg/m²  Physical Exam  Vitals signs and nursing note reviewed  Constitutional:       Appearance: Normal appearance  Comments: BMI 25 70    HENT:      Mouth/Throat:      Mouth: Mucous membranes are dry  Eyes:      Extraocular Movements: Extraocular movements intact  Neck:      Musculoskeletal: Normal range of motion  Cardiovascular:      Rate and Rhythm: Normal rate and regular rhythm  Pulses: Normal pulses  Heart sounds: Normal heart sounds  Abdominal:      General: Abdomen is flat  Palpations: Abdomen is soft  Skin:     General: Skin is dry  Neurological:      Mental Status: She is alert and oriented to person, place, and time     Psychiatric:         Mood and Affect: Mood normal          Behavior: Behavior normal        CBC and differential  Order: [id number]  Status:  Final result   Visible to patient:  No (inaccessible in [facility])   Next appt:  08/09/2021 at 11:30 AM in Family Medicine KRISTINA Gates)   Ref Range & Units 4/5/21 7:37 AM   White Blood Cell Count 3 4 - 10 8 x10E3/uL 5 4    Red Blood Cell Count 3 77 - 5 28 x10E6/uL 3 79    Hemoglobin 11 1 - 15 9 g/dL 12 1    HCT 34 0 - 46 6 % 35 9    MCV 79 - 97 fL 95    MCH 26 6 - 33 0 pg 31 9    MCHC 31 5 - 35 7 g/dL 33 7    RDW 11 7 - 15 4 % 12 1    Platelet Count 103 - 450 x10E3/uL 250    Neutrophils Not Estab  % 61    Lymphocytes Not Estab  % 21    Monocytes Not Estab  % 11    Eosinophils Not Estab  % 6    Basophils PCT Not Estab  % 1    Neutrophils (Absolute) 1 4 - 7 0 x10E3/uL 3 3    Lymphocytes (Absolute) 0 7 - 3 1 x10E3/uL 1 1    Monocytes (Absolute) 0 1 - 0 9 x10E3/uL 0 6    Eosinophils (Absolute) 0 0 - 0 4 x10E3/uL 0 3    Basophils ABS 0 0 - 0 2 x10E3/uL 0 0    Immature Granulocytes Not Estab  % 0    Immature Granulocytes (Absolute) 0 0 - 0 1 x10E3/uL 0 0       Narrative    Performed at: [address], [address]   Director: Maya Garcia MD, Phone:  [phone] Comment: A duplicate   report has been generated due to demographic updates        Specimen Collected: 04/05/21  7:37 AM   Last Resulted: 04/06/21  4:06 PM           Contains abnormal data Comprehensive metabolic panel  Order: [id number]  Status:  Final result   Visible to patient:  No (inaccessible in [facility])   Next appt:  08/09/2021 at 11:30 AM in [facility])   Ref Range & Units 4/5/21 7:37 AM   Glucose, Random 65 - 99 mg/dL 98    BUN 8 - 27 mg/dL 8    Creatinine 0 57 - 1 00 mg/dL 0 75    eGFR Non  >59 mL/min/1 73 77    eGFR  >59 mL/min/1 73 88    SL AMB BUN/CREATININE RATIO 12 - 28 11Low     Sodium 134 - 144 mmol/L 129Low     Potassium 3 5 - 5 2 mmol/L 4 6    Chloride 96 - 106 mmol/L 92Low     CO2 20 - 29 mmol/L 25    CALCIUM 8 7 - 10 3 mg/dL 8 8    Protein, Total 6 0 - 8 5 g/dL 6 8    Albumin 3 7 - 4 7 g/dL 4 0    Globulin, Total 1 5 - 4 5 g/dL 2 8    Albumin/Globulin Ratio 1 2 - 2 2 1 4    TOTAL BILIRUBIN 0 0 - 1 2 mg/dL 0 5    Alk Phos Isoenzymes 39 - 117 IU/L 110    AST 0 - 40 IU/L 27    ALT 0 - 32 IU/L 27       Narrative          Urinalysis with microscopic  Order: [id number]  Status:  Final result   Visible to patient:  No (inaccessible in [facility])   Next appt:  08/09/2021 at 11:30 AM in [facility])   Ref Range & Units 4/5/21 7:37 AM   Specific Gravity 1 005 - 1 030 1 010    Ph 5 0 - 7 5 7 0    Color UA Yellow Yellow    Urine Appearance Clear Clear    Leukocyte Esterase Negative Negative    Protein Negative/Trace Negative    Glucose, 24 HR Urine Negative Negative    Ketone, Urine Negative Negative    Blood, Urine Negative Negative Bilirubin, Urine Negative Negative    Urobilinogen Urine 0 2 - 1 0 mg/dL 0 2    SL AMB NITRITES URINE, QUAL  Negative Negative    Microscopic Examination  Comment    Comment: Microscopic follows if indicated  Microscopic Examination  See below:    Comment: Microscopic was indicated and was performed        Narrative    Performed at[facility]   Director: Rosa Baca MD, Phone:  [phone]           Microscopic Examination  Order: [id number] - Reflex for Order [id number]  Status:  Final result   Visible to patient:  No (inaccessible in [address])   Next appt:  08/09/2021 at 11:30 AM in [facility])   Ref Range & Units 4/5/21 7:37 AM   SL AMB WBC, URINE 0 - 5 /hpf 0-5    RBC, Urine 0 - 2 /hpf 0-2    Epithelial Cells (non renal) 0 - 10 /hpf 0-10    Bacteria, Urine None seen/Few None seen       Narrative    Performed at: [facility] [place], [place]  [id number]   Director: Rosa Baca MD, Phone:  [phone]      Specimen Collected: 04/05/21  7:37 AM   Last Resulted: 04/06/21  4:06 PM              Lipid panel  Order: [id number]  Status:  Final result   Visible to patient:  No (in[facility] [address] [facility])   Next appt:  08/09/2021 at 11:30 AM in [facility])   Ref Range & Units 4/5/21 7:37 AM   Cholesterol, Total 100 - 199 mg/dL 137    Triglycerides 0 - 149 mg/dL 45    HDL >39 mg/dL 63    VLDL Cholesterol Calculated 5 - 40 mg/dL 10    LDL Calculated 0 - 99 mg/dL 64       Narrative    Performed at: [facility] [place], [place]  [id number]   Director: Rosa Baca MD, Phone:  [phone]      Specimen Collected: 04/05/21  7:37 AM   Last Resulted: 04/06/21  4:06 PM           TSH, 3rd generation  Order: [id number]  Status:  Final result   Visible to patient:  No (inaccessible in [address])   Next appt:  08/09/2021 at 11:30 AM in [facility] KRISTINA Montaño)   Ref Range & Units 4/5/21 7:37 AM   TSH 0 450 - 4 500 uIU/mL 2 660       Narrative    Performed at: [address], [place]  [id number]   Director: Roberto Amin MD, Phone:  [phone]      Specimen Collected: 04/05/21  7:37 AM   Last Resulted: 04/06/21  4:06 PM            TULIO w/Reflex  Order: [id number]  Status:  Final result   Visible to patient:  No (inaccessible in [facility])   Next appt:  08/09/2021 at 11:30 AM in Family Medicine Sophie Subramanian, [address])   Ref Range & Units 4/5/21 7:37 AM   TULIO Negative Negative       Narrative    Performed at: [address] 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        Last Assessment & Plan:     Serum sodium is stable at an acceptable level  Continue avoiding excessive fluid intake  Continue Lasix  Avoid NSAIDs, Thiazide diuretics, and psychotropic meds if possible      Essential hypertension 11/08/2017   Last Assessment & Plan:     Blood pressure is acceptable  Continue current regimen        As per CARDIOLOGY     Assessment & Plan:    Essential hypertension  Her BP is generally somewhat better controlled than today  She will work on her diet and cut back on her sodium intake to improve her blood pressure  Mixed hyperlipidemia  On a statin  Will work more on her diet  Shaggy aorta syndrome (HCC)  On dual antiplatelets lifelong to prevent further embolic episodes

## 2021-05-12 DIAGNOSIS — D73.5 SPLENIC INFARCTION: ICD-10-CM

## 2021-05-12 RX ORDER — ASPIRIN 81 MG/1
81 TABLET, COATED ORAL DAILY
Qty: 90 TABLET | Refills: 1 | Status: SHIPPED | OUTPATIENT
Start: 2021-05-12 | End: 2021-05-13 | Stop reason: SDUPTHER

## 2021-05-13 DIAGNOSIS — D73.5 SPLENIC INFARCTION: ICD-10-CM

## 2021-05-13 RX ORDER — ASPIRIN 81 MG/1
81 TABLET, COATED ORAL DAILY
Qty: 90 TABLET | Refills: 1 | Status: SHIPPED | OUTPATIENT
Start: 2021-05-13

## 2021-05-19 DIAGNOSIS — L40.9 PSORIASIS OF SCALP: Primary | ICD-10-CM

## 2021-05-19 RX ORDER — CLOBETASOL PROPIONATE 0.46 MG/ML
SOLUTION TOPICAL
Qty: 50 ML | Status: CANCELLED | OUTPATIENT
Start: 2021-05-19

## 2021-05-19 RX ORDER — CLOBETASOL PROPIONATE 0.46 MG/ML
SOLUTION TOPICAL 2 TIMES DAILY
Qty: 150 ML | Refills: 1 | Status: SHIPPED | OUTPATIENT
Start: 2021-05-19

## 2021-05-26 LAB — HCV AB SER-ACNC: <0.1

## 2021-07-06 ENCOUNTER — TELEPHONE (OUTPATIENT)
Dept: FAMILY MEDICINE CLINIC | Facility: CLINIC | Age: 81
End: 2021-07-06

## 2021-07-06 NOTE — PROGRESS NOTES
Let patient know I reviewed labs however these are rheumatology labs- did she have anything else done ? ?

## 2021-07-06 NOTE — TELEPHONE ENCOUNTER
----- Message from Libby Daugherty sent at 7/6/2021  8:15 AM EDT -----      ----- Message -----  From: Marisela Enamorado  Sent: 7/3/2021   8:43 AM EDT  To: KRISTINA Daugherty

## 2021-07-19 ENCOUNTER — OFFICE VISIT (OUTPATIENT)
Dept: DERMATOLOGY | Facility: CLINIC | Age: 81
End: 2021-07-19
Payer: MEDICARE

## 2021-07-19 VITALS — HEIGHT: 69 IN | BODY MASS INDEX: 25.76 KG/M2 | TEMPERATURE: 98.2 F | WEIGHT: 173.94 LBS

## 2021-07-19 DIAGNOSIS — D48.5 NEOPLASM OF UNCERTAIN BEHAVIOR OF SKIN: Primary | ICD-10-CM

## 2021-07-19 DIAGNOSIS — L40.9 PSORIASIS OF SCALP: ICD-10-CM

## 2021-07-19 PROCEDURE — 11102 TANGNTL BX SKIN SINGLE LES: CPT | Performed by: DERMATOLOGY

## 2021-07-19 PROCEDURE — 88305 TISSUE EXAM BY PATHOLOGIST: CPT | Performed by: STUDENT IN AN ORGANIZED HEALTH CARE EDUCATION/TRAINING PROGRAM

## 2021-07-19 PROCEDURE — 99213 OFFICE O/P EST LOW 20 MIN: CPT | Performed by: DERMATOLOGY

## 2021-07-19 NOTE — PROGRESS NOTES
[facility] Follow Up Note    Patient Name: Jaden Plaster  Encounter Date: 7/19/2021    Today's Chief Concerns:  Torito Clay Concern #1:  Scalp Psoriasis    Concern #2:  Lesion on upper back       Current Medications:    Current Outpatient Medications:     Aspirin Low Dose 81 MG EC tablet, Take 1 tablet (81 mg total) by mouth daily, Disp: 90 tablet, Rfl: 1    atorvastatin (LIPITOR) 40 mg tablet, TAKE 2 TABLETS BY MOUTH EVERY DAY, Disp: 180 tablet, Rfl: 1    B Complex Vitamins (B COMPLEX 1 PO), Take 2 tablets by mouth daily Each tablet is 40mg, Disp: , Rfl:     clobetasol (TEMOVATE) 0 05 % external solution, Apply topically 2 (two) times a day To scalp for psoriasis, Disp: 150 mL, Rfl: 1    clopidogrel (PLAVIX) 75 mg tablet, Take 1 tablet (75 mg total) by mouth daily, Disp: 90 tablet, Rfl: 1    cycloSPORINE (RESTASIS) 0 05 % ophthalmic emulsion, Apply 1 drop to eye 2 (two) times a day, Disp: , Rfl:     fluticasone ([employer] Fluticasone Propionate) 50 mcg/act nasal spray, Spray 1 spray into each nostril every day, Disp: 9 9 mL, Rfl: 3    fluticasone (VERAMYST) 27 5 MCG/SPRAY nasal spray, 2 sprays into each nostril daily, Disp: , Rfl:     furosemide (LASIX) 20 mg tablet, Take 1 tablet (20 mg total) by mouth daily, Disp: 90 tablet, Rfl: 0    labetalol (NORMODYNE) 100 mg tablet, Take 2 tablets (200 mg total) by mouth every 8 (eight) hours, Disp: 540 tablet, Rfl: 1    latanoprost (Xalatan) 0 005 % ophthalmic solution, 2 drops each eye  in PM, Disp: , Rfl:     losartan (COZAAR) 100 MG tablet, Take 1 tablet (100 mg total) by mouth daily, Disp: 90 tablet, Rfl: 4    montelukast (SINGULAIR) 10 mg tablet, Take 1 tablet (10 mg total) by mouth daily at bedtime, Disp: 90 tablet, Rfl: 0    multivitamin (THERAGRAN) TABS, Take 1 tablet by mouth daily, Disp: , Rfl:     olopatadine HCl (PATADAY) 0 2 % opth drops, Apply 1 drop to eye 2 (two) times a day, Disp: , Rfl:     omega-3-acid ethyl esters (Lovaza) 1 g capsule, Take 2 capsules (2 g total) by mouth 2 (two) times a day, Disp: 360 capsule, Rfl: 1    CONSTITUTIONAL:   There were no vitals filed for this visit  Specific Alerts:    Have you been seen by a [facility] Dermatologist in the last 3 years? YES    Are you pregnant or planning to become pregnant? No    Are you currently or planning to be nursing or breast feeding? No    Allergies   Allergen Reactions    Potassium Chloride Other (See Comments)    Xanax [Alprazolam]        May we call your Preferred Phone number to discuss your specific medical information? YES    May we leave a detailed message that includes your specific medical information? YES    Have you traveled outside of the [facility] in the past 3 months? No    Do you currently have a pacemaker or defibrillator? No    Do you have any artificial heart valves, joints, plates, screws, rods, stents, pins, etc? No   - If Yes, were any placed within the last 2 years? Do you require any medications prior to a surgical procedure? No      Are you taking any medications that cause you to bleed more easily ("blood thinners") YES    Have you ever experienced a rapid heartbeat with epinephrine? No    Have you ever been treated with "gold" (gold sodium thiomalate) therapy? No    [facility] can help with wrinkles, "laugh lines," facial volume loss, "double chin," "love handles," age spots, and more  Are you interested in learning today about some of the skin enhancement procedures that we offer? (If Yes, please provide more detail) No    Review of Systems:  Have you recently had or currently have any of the following?     · Fever or chills: No  · Night Sweats: No  · Headaches: No  · Weight Gain: No  · Weight Loss: No  · Blurry Vision: No  · Nausea: No  · Vomiting: No  · Diarrhea: No  · Blood in Stool: No  · Abdominal Pain: No  · Itchy Skin: YES  · Painful Joints: YES  · Swollen Joints: YES  · Muscle Pain: YES  · Irregular Mole: No  · Sun Burn: No  · Dry Skin: YES  · Skin Color Changes: No  · Scar or Keloid: No  · Cold Sores/Fever Blisters: No  · Bacterial Infections/MRSA: No  · Anxiety: No  · Depression: No  · Suicidal or Homicidal Thoughts: No      PSYCH: Normal mood and affect  EYES: Normal conjunctiva  ENT: Normal lips and oral mucosa  CARDIOVASCULAR: No edema  RESPIRATORY: Normal respirations  HEME/LYMPH/IMMUNO:  No regional lymphadenopathy except as noted below in ASSESSMENT AND PLAN BY DIAGNOSIS    FULL ORGAN SYSTEM SKIN EXAM (SKIN)   Hair, Scalp, back  Normal except as noted below in Assessment       1  PSORIASIS OF THE SCALP    Physical Exam:   Anatomic Location Affected:  Scalp   Morphological Description:  No signs of flaking  Scalp mildly erythematous       Additional History of Present Condition:  Patient uses [employer] for shampoo  Patient states her scalp is still flaky  Patient still presently uses Clobetasol 0 05% solution one to two times a day depending on how much active itching occurs  Assessment and Plan:  Based on a thorough discussion of this condition and the management approach to it (including a comprehensive discussion of the known risks, side effects and potential benefits of treatment), the patient (family) agrees to implement the following specific plan:   Continue to use your scalp regimen   Continue to use Clobetasol 0 05% solution to the scalp twice a day for the itchiness  When the Scalp becomes less itchy lower the application to once a day or until as needed  2  NEOPLASM OF UNCERTAIN BEHAVIOR OF SKIN    Physical Exam:   (Anatomic Location); (Size and Morphological Description); (Differential Diagnosis):  o Mid upper back; 4 cm x 2 5 cm;  irregular scaly pink patch; Differential diagnosis; Basal cell carcinoma vs Squamous cell carcinoma          Additional History of Present Condition:  Present for 30 plus years   Patient states it is itchy and occasionally bleeds with repeated scratching  Assessment and Plan:   I have discussed with the patient that a sample of skin via a "skin biopsy would be potentially helpful to further make a specific diagnosis under the microscope   Based on a thorough discussion of this condition and the management approach to it (including a comprehensive discussion of the known risks, side effects and potential benefits of treatment), the patient (family) agrees to implement the following specific plan:    o Procedure:  Skin Biopsy  After a thorough discussion of treatment options and risk/benefits/alternatives (including but not limited to local pain, scarring, dyspigmentation, blistering, possible superinfection, and inability to confirm a diagnosis via histopathology), verbal and written consent were obtained and portion of the rash was biopsied for tissue sample  See below for consent that was obtained from patient and subsequent Procedure Note  PROCEDURE SHAVE BIOPSY NOTE:     Performing Physician: Neelam Barreto   Anatomic Location; Clinical Description with size (cm); Pre-Op Diagnosis:   o Mid upper back; 4 cm x 2 5 cm; erythematous irregular scaly pink patch; Differential diagnosis; Basal cell carcinoma vs Squamous cell carcinoma    o    Post-op diagnosis: Same      Local anesthesia: 1% xylocaine with epi       Topical anesthesia: None     Hemostasis: Aluminum chloride       After obtaining informed consent  at which time there was a discussion about the purpose of biopsy  and low risks of infection and bleeding  The area was prepped and draped in the usual fashion  Anesthesia was obtained with 1% lidocaine with epinephrine  A shave biopsy to an appropriate sampling depth was obtained with a sterile blade (such as a 15-blade or DermaBlade)  The resulting wound was covered with surgical ointment and bandaged appropriately  The patient tolerated the procedure well without complications and was without signs of functional compromise  Specimen has been sent for review by Dermatopathology  Standard post-procedure care has been explained and has been included in written form within the patient's copy of Informed Consent  INFORMED CONSENT DISCUSSION AND POST-OPERATIVE INSTRUCTIONS FOR PATIENT    I   RATIONALE FOR PROCEDURE  I understand that a skin biopsy allows the Dermatologist to test a lesion or rash under the microscope to obtain a diagnosis  It usually involves numbing the area with numbing medication and removing a small piece of skin; sometimes the area will be closed with sutures  In this specific procedure, sutures are not usually needed  If any sutures are placed, then they are usually need to be removed in 2 weeks or less  I understand that my Dermatologist recommends that a skin "shave" biopsy be performed today  A local anesthetic, similar to the kind that a dentist uses when filling a cavity, will be injected with a very small needle into the skin area to be sampled  The injected skin and tissue underneath "will go to sleep and become numb so no pain should be felt afterwards  An instrument shaped like a tiny "razor blade" (shave biopsy instrument) will be used to cut a small piece of tissue and skin from the area so that a sample of tissue can be taken and examined more closely under the microscope  A slight amount of bleeding will occur, but it will be stopped with direct pressure and a pressure bandage and any other appropriate methods  I understands that a scar will form where the wound was created  Surgical ointment will be applied to help protect the wound  Sutures are not usually needed      II   RISKS AND POTENTIAL COMPLICATIONS   I understand the risks and potential complications of a skin biopsy include but are not limited to the following:   Bleeding   Infection   Pain   Scar/keloid   Skin discoloration   Incomplete Removal   Recurrence   Nerve Damage/Numbness/Loss of Function   Allergic Reaction to Anesthesia   Biopsies are diagnostic procedures and based on findings additional treatment or evaluation may be required   Loss or destruction of specimen resulting in no additional findings    My Dermatologist has explained to me the nature of the condition, the nature of the procedure, and the benefits to be reasonably expected compared with alternative approaches  My Dermatologist has discussed the likelihood of major risks or complications of this procedure including the specific risks listed above, such as bleeding, infection, and scarring/keloid  I understand that a scar is expected after this procedure  I understand that my physician cannot predict if the scar will form a "keloid," which extends beyond the borders of the wound that is created  A keloid is a thick, painful, and bumpy scar  A keloid can be difficult to treat, as it does not always respond well to therapy, which includes injecting cortisone directly into the keloid every few weeks  While this usually reduces the pain and size of the scar, it does not eliminate it  I understand that photographs may be taken before and after the procedure  These will be maintained as part of the medical providers confidential records and may not be made available to me  I further authorize the medical provider to use the photographs for teaching purposes or to illustrate scientific papers, books, or lectures if in his/her judgment, medical research, education, or science may benefit from its use  I have had an opportunity to fully inquire about the risks and benefits of this procedure and its alternatives  I have been given ample time and opportunity to ask questions and to seek a second opinion if I wished to do so  I acknowledge that there have specifically been no guarantees as to the cosmetic results from the procedure  I am aware that with any procedure there is always the possibility of an unexpected complication  III  POST-PROCEDURAL CARE (WHAT YOU WILL NEED TO DO "AFTER THE BIOPSY" TO OPTIMIZE HEALING)     Keep the area clean and dry  Try NOT to remove the bandage or get it wet for the first 24 hours   Gently clean the area and apply surgical ointment (such as Vaseline petrolatum ointment, which is available "over the counter" and not a prescription) to the biopsy site for up to 2 weeks straight  This acts to protect the wound from the outside world   Sutures are not usually placed in this procedure  If any sutures were placed, return for suture removal as instructed (generally 1 week for the face, 2 weeks for the body)   Take Acetaminophen (Tylenol) for discomfort, if no contraindications  Ibuprofen or aspirin could make bleeding worse   Call our office immediately for signs of infection: fever, chills, increased redness, warmth, tenderness, discomfort/pain, or pus or foul smell coming from the wound  WHAT TO DO IF THERE IS ANY BLEEDING? If a small amount of bleeding is noticed, place a clean cloth over the area and apply firm pressure for ten minutes  Check the wound after 10 minutes of direct pressure  If bleeding persists, try one more time for an additional 10 minutes of direct pressure on the area  If the bleeding becomes heavier or does not stop after the second attempt, or if you have any other questions about this procedure, then please call your [facility] Dermatologist by calling [phone] (SKIN)  I hereby acknowledge that I have reviewed and verified the site with my Dermatologist and have requested and authorized my Dermatologist to proceed with the procedure          Scribe Attestation    I,:  Dois Laughter am acting as a scribe while in the presence of the attending physician :       I,:  Yolanda Shelley MD personally performed the services described in this documentation    as scribed in my presence :

## 2021-07-19 NOTE — PATIENT INSTRUCTIONS
INFORMED CONSENT DISCUSSION AND POST-OPERATIVE INSTRUCTIONS FOR PATIENT    I   RATIONALE FOR PROCEDURE  I understand that a skin biopsy allows the Dermatologist to test a lesion or rash under the microscope to obtain a diagnosis  It usually involves numbing the area with numbing medication and removing a small piece of skin; sometimes the area will be closed with sutures  In this specific procedure, sutures are not usually needed  If any sutures are placed, then they are usually need to be removed in 2 weeks or less  I understand that my Dermatologist recommends that a skin "shave" biopsy be performed today  A local anesthetic, similar to the kind that a dentist uses when filling a cavity, will be injected with a very small needle into the skin area to be sampled  The injected skin and tissue underneath "will go to sleep and become numb so no pain should be felt afterwards  An instrument shaped like a tiny "razor blade" (shave biopsy instrument) will be used to cut a small piece of tissue and skin from the area so that a sample of tissue can be taken and examined more closely under the microscope  A slight amount of bleeding will occur, but it will be stopped with direct pressure and a pressure bandage and any other appropriate methods  I understands that a scar will form where the wound was created  Surgical ointment will be applied to help protect the wound  Sutures are not usually needed      II   RISKS AND POTENTIAL COMPLICATIONS   I understand the risks and potential complications of a skin biopsy include but are not limited to the following:   Bleeding   Infection   Pain   Scar/keloid   Skin discoloration   Incomplete Removal   Recurrence   Nerve Damage/Numbness/Loss of Function   Allergic Reaction to Anesthesia   Biopsies are diagnostic procedures and based on findings additional treatment or evaluation may be required   Loss or destruction of specimen resulting in no additional findings    My Dermatologist has explained to me the nature of the condition, the nature of the procedure, and the benefits to be reasonably expected compared with alternative approaches  My Dermatologist has discussed the likelihood of major risks or complications of this procedure including the specific risks listed above, such as bleeding, infection, and scarring/keloid  I understand that a scar is expected after this procedure  I understand that my physician cannot predict if the scar will form a "keloid," which extends beyond the borders of the wound that is created  A keloid is a thick, painful, and bumpy scar  A keloid can be difficult to treat, as it does not always respond well to therapy, which includes injecting cortisone directly into the keloid every few weeks  While this usually reduces the pain and size of the scar, it does not eliminate it  I understand that photographs may be taken before and after the procedure  These will be maintained as part of the medical providers confidential records and may not be made available to me  I further authorize the medical provider to use the photographs for teaching purposes or to illustrate scientific papers, books, or lectures if in his/her judgment, medical research, education, or science may benefit from its use  I have had an opportunity to fully inquire about the risks and benefits of this procedure and its alternatives  I have been given ample time and opportunity to ask questions and to seek a second opinion if I wished to do so  I acknowledge that there have specifically been no guarantees as to the cosmetic results from the procedure  I am aware that with any procedure there is always the possibility of an unexpected complication  III  POST-PROCEDURAL CARE (WHAT YOU WILL NEED TO DO "AFTER THE BIOPSY" TO OPTIMIZE HEALING)     Keep the area clean and dry    Try NOT to remove the bandage or get it wet for the first 24 hours      Gently clean the area and apply surgical ointment (such as Vaseline petrolatum ointment, which is available "over the counter" and not a prescription) to the biopsy site for up to 2 weeks straight  This acts to protect the wound from the outside world   Sutures are not usually placed in this procedure  If any sutures were placed, return for suture removal as instructed (generally 1 week for the face, 2 weeks for the body)   Take Acetaminophen (Tylenol) for discomfort, if no contraindications  Ibuprofen or aspirin could make bleeding worse   Call our office immediately for signs of infection: fever, chills, increased redness, warmth, tenderness, discomfort/pain, or pus or foul smell coming from the wound  WHAT TO DO IF THERE IS ANY BLEEDING? If a small amount of bleeding is noticed, place a clean cloth over the area and apply firm pressure for ten minutes  Check the wound after 10 minutes of direct pressure  If bleeding persists, try one more time for an additional 10 minutes of direct pressure on the area  If the bleeding becomes heavier or does not stop after the second attempt, or if you have any other questions about this procedure, then please call your [facility] Dermatologist by calling [phone] (SKIN)  PSORIASIS OF THE SCALP    Assessment and Plan:  Based on a thorough discussion of this condition and the management approach to it (including a comprehensive discussion of the known risks, side effects and potential benefits of treatment), the patient (family) agrees to implement the following specific plan:   Continue to use your scalp regiment   Continue to use Clobetasol 0 05% solution to the scalp twice a day for the itchiness  When the Scalp becomes less itchy lower the application to once a day or until as needed

## 2021-07-23 NOTE — RESULT ENCOUNTER NOTE
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) Dx:  Neoplasm of uncertain behavior of skin  0 Result Notes  Component   Case Report  Surgical Pathology Report                         Case: [id number]                                   Authorizing Provider: Sofie Gallegos MD      Collected:           07/19/2021 1147              Ordering Location:     [facility]      Received:            07/19/2021 [facility]                                     [place]                                                                       Pathologist:           Lucas Tipton MD                                                           Specimen:    Skin, Other, A: Mid upper back                                                           Final Diagnosis  A  Skin, mid upper back, shave biopsy:     Consistent with SQUAMOUS CELL CARCINOMA IN SITU; 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

## 2021-07-26 ENCOUNTER — TELEPHONE (OUTPATIENT)
Dept: DERMATOLOGY | Facility: CLINIC | Age: 81
End: 2021-07-26

## 2021-07-26 NOTE — TELEPHONE ENCOUNTER
Patient called requesting to fax pathology report to Dr Ha Ford to continue treatment of MOHS  She is a new patient at their office so they will need clinical notes and photos from us to continue with treatment  Does patient need to sign a record release form? Or since she is being referred to Dr Ha Ford from us, is it okay to fax information

## 2021-09-17 ENCOUNTER — OFFICE VISIT (OUTPATIENT)
Dept: FAMILY MEDICINE CLINIC | Facility: CLINIC | Age: 81
End: 2021-09-17
Payer: MEDICARE

## 2021-09-17 VITALS
HEIGHT: 67 IN | OXYGEN SATURATION: 98 % | RESPIRATION RATE: 16 BRPM | WEIGHT: 165.6 LBS | HEART RATE: 73 BPM | TEMPERATURE: 96.8 F | DIASTOLIC BLOOD PRESSURE: 84 MMHG | SYSTOLIC BLOOD PRESSURE: 158 MMHG | BODY MASS INDEX: 25.99 KG/M2

## 2021-09-17 DIAGNOSIS — Z87.2 HISTORY OF SURGICAL REMOVAL OF SKIN LESION: ICD-10-CM

## 2021-09-17 DIAGNOSIS — I10 ESSENTIAL HYPERTENSION: ICD-10-CM

## 2021-09-17 DIAGNOSIS — E78.2 MIXED HYPERLIPIDEMIA: Primary | ICD-10-CM

## 2021-09-17 DIAGNOSIS — R13.14 PHARYNGOESOPHAGEAL DYSPHAGIA: ICD-10-CM

## 2021-09-17 DIAGNOSIS — Z98.890 HISTORY OF SURGICAL REMOVAL OF SKIN LESION: ICD-10-CM

## 2021-09-17 PROBLEM — R60.0 EDEMA OF LOWER EXTREMITY: Status: ACTIVE | Noted: 2020-01-10

## 2021-09-17 PROCEDURE — 99214 OFFICE O/P EST MOD 30 MIN: CPT | Performed by: NURSE PRACTITIONER

## 2021-09-17 RX ORDER — MINERAL OIL, PETROLATUM 425; 573 MG/G; MG/G
1 OINTMENT OPHTHALMIC AS NEEDED
COMMUNITY

## 2021-09-22 DIAGNOSIS — I10 ESSENTIAL HYPERTENSION: ICD-10-CM

## 2021-09-22 RX ORDER — LABETALOL 100 MG/1
200 TABLET, FILM COATED ORAL EVERY 8 HOURS
Qty: 540 TABLET | Refills: 1 | Status: SHIPPED | OUTPATIENT
Start: 2021-09-22 | End: 2022-01-18

## 2021-10-06 DIAGNOSIS — J30.2 SEASONAL ALLERGIES: ICD-10-CM

## 2021-10-06 RX ORDER — FLUTICASONE PROPIONATE 50 MCG
SPRAY, SUSPENSION (ML) NASAL
Qty: 9.9 ML | Refills: 3 | Status: SHIPPED | OUTPATIENT
Start: 2021-10-06 | End: 2021-11-22

## 2021-10-08 ENCOUNTER — TELEPHONE (OUTPATIENT)
Dept: FAMILY MEDICINE CLINIC | Facility: CLINIC | Age: 81
End: 2021-10-08

## 2021-10-08 DIAGNOSIS — E87.8 ELECTROLYTE ABNORMALITY: ICD-10-CM

## 2021-10-08 DIAGNOSIS — R60.1 GENERALIZED EDEMA: Primary | ICD-10-CM

## 2021-10-08 DIAGNOSIS — E78.2 MIXED HYPERLIPIDEMIA: ICD-10-CM

## 2021-11-12 ENCOUNTER — TELEPHONE (OUTPATIENT)
Dept: FAMILY MEDICINE CLINIC | Facility: CLINIC | Age: 81
End: 2021-11-12

## 2021-11-12 DIAGNOSIS — J01.10 ACUTE NON-RECURRENT FRONTAL SINUSITIS: Primary | ICD-10-CM

## 2021-11-12 DIAGNOSIS — J32.1 CHRONIC FRONTAL SINUSITIS: Primary | ICD-10-CM

## 2021-11-12 DIAGNOSIS — R51.9 NONINTRACTABLE HEADACHE, UNSPECIFIED CHRONICITY PATTERN, UNSPECIFIED HEADACHE TYPE: ICD-10-CM

## 2021-11-12 RX ORDER — AMOXICILLIN 250 MG/5ML
10 POWDER, FOR SUSPENSION ORAL 3 TIMES DAILY
Qty: 300 ML | Refills: 0 | Status: SHIPPED | OUTPATIENT
Start: 2021-11-12 | End: 2021-11-22

## 2021-11-12 RX ORDER — AMOXICILLIN AND CLAVULANATE POTASSIUM 875; 125 MG/1; MG/1
1 TABLET, FILM COATED ORAL EVERY 12 HOURS SCHEDULED
Qty: 20 TABLET | Refills: 0 | Status: SHIPPED | OUTPATIENT
Start: 2021-11-12 | End: 2021-11-12

## 2021-11-22 DIAGNOSIS — J30.2 SEASONAL ALLERGIES: ICD-10-CM

## 2021-11-22 RX ORDER — FLUTICASONE PROPIONATE 50 MCG
SPRAY, SUSPENSION (ML) NASAL
Qty: 48 ML | Refills: 1 | Status: SHIPPED | OUTPATIENT
Start: 2021-11-22

## 2021-11-26 LAB
ALBUMIN SERPL-MCNC: 4 G/DL (ref 3.7–4.7)
ALBUMIN/GLOB SERPL: 1.4 {RATIO} (ref 1.2–2.2)
ALP SERPL-CCNC: 102 IU/L (ref 44–121)
ALT SERPL-CCNC: 20 IU/L (ref 0–32)
AST SERPL-CCNC: 25 IU/L (ref 0–40)
BASOPHILS # BLD AUTO: 0 X10E3/UL (ref 0–0.2)
BASOPHILS NFR BLD AUTO: 1 %
BILIRUB SERPL-MCNC: 0.6 MG/DL (ref 0–1.2)
BUN SERPL-MCNC: 7 MG/DL (ref 8–27)
BUN/CREAT SERPL: 9 (ref 12–28)
CALCIUM SERPL-MCNC: 9.4 MG/DL (ref 8.7–10.3)
CHLORIDE SERPL-SCNC: 93 MMOL/L (ref 96–106)
CHOLEST SERPL-MCNC: 155 MG/DL (ref 100–199)
CO2 SERPL-SCNC: 26 MMOL/L (ref 20–29)
CORTIS AM PEAK SERPL-MCNC: 18.4 UG/DL (ref 6.2–19.4)
CREAT SERPL-MCNC: 0.79 MG/DL (ref 0.57–1)
EOSINOPHIL # BLD AUTO: 0.1 X10E3/UL (ref 0–0.4)
EOSINOPHIL NFR BLD AUTO: 3 %
ERYTHROCYTE [DISTWIDTH] IN BLOOD BY AUTOMATED COUNT: 11.8 % (ref 11.7–15.4)
GLOBULIN SER-MCNC: 2.8 G/DL (ref 1.5–4.5)
GLUCOSE SERPL-MCNC: 99 MG/DL (ref 65–99)
HCT VFR BLD AUTO: 36.8 % (ref 34–46.6)
HDLC SERPL-MCNC: 63 MG/DL
HGB BLD-MCNC: 12.3 G/DL (ref 11.1–15.9)
IMM GRANULOCYTES # BLD: 0 X10E3/UL (ref 0–0.1)
IMM GRANULOCYTES NFR BLD: 0 %
LDLC SERPL CALC-MCNC: 83 MG/DL (ref 0–99)
LYMPHOCYTES # BLD AUTO: 1 X10E3/UL (ref 0.7–3.1)
LYMPHOCYTES NFR BLD AUTO: 21 %
MAGNESIUM SERPL-MCNC: 1.8 MG/DL (ref 1.6–2.3)
MCH RBC QN AUTO: 31.9 PG (ref 26.6–33)
MCHC RBC AUTO-ENTMCNC: 33.4 G/DL (ref 31.5–35.7)
MCV RBC AUTO: 96 FL (ref 79–97)
MONOCYTES # BLD AUTO: 0.4 X10E3/UL (ref 0.1–0.9)
MONOCYTES NFR BLD AUTO: 9 %
NEUTROPHILS # BLD AUTO: 3.3 X10E3/UL (ref 1.4–7)
NEUTROPHILS NFR BLD AUTO: 66 %
PLATELET # BLD AUTO: 279 X10E3/UL (ref 150–450)
POTASSIUM SERPL-SCNC: 4.5 MMOL/L (ref 3.5–5.2)
PROT SERPL-MCNC: 6.8 G/DL (ref 6–8.5)
PTH-INTACT SERPL-MCNC: 33 PG/ML (ref 15–65)
RBC # BLD AUTO: 3.85 X10E6/UL (ref 3.77–5.28)
SL AMB EGFR AFRICAN AMERICAN: 82 ML/MIN/1.73
SL AMB EGFR NON AFRICAN AMERICAN: 71 ML/MIN/1.73
SL AMB VLDL CHOLESTEROL CALC: 9 MG/DL (ref 5–40)
SODIUM SERPL-SCNC: 131 MMOL/L (ref 134–144)
TRIGL SERPL-MCNC: 40 MG/DL (ref 0–149)
TSH SERPL DL<=0.005 MIU/L-ACNC: 1.69 UIU/ML (ref 0.45–4.5)
WBC # BLD AUTO: 5 X10E3/UL (ref 3.4–10.8)

## 2022-01-04 DIAGNOSIS — J30.2 SEASONAL ALLERGIES: ICD-10-CM

## 2022-01-04 RX ORDER — MONTELUKAST SODIUM 10 MG/1
10 TABLET ORAL
Qty: 90 TABLET | Refills: 0 | Status: SHIPPED | OUTPATIENT
Start: 2022-01-04 | End: 2022-04-26

## 2022-01-18 DIAGNOSIS — I10 ESSENTIAL HYPERTENSION: ICD-10-CM

## 2022-01-18 RX ORDER — LABETALOL 100 MG/1
200 TABLET, FILM COATED ORAL EVERY 8 HOURS
Qty: 540 TABLET | Refills: 1 | Status: SHIPPED | OUTPATIENT
Start: 2022-01-18 | End: 2022-04-18

## 2022-01-18 RX ORDER — LOSARTAN POTASSIUM 100 MG/1
TABLET ORAL
Qty: 90 TABLET | Refills: 4 | Status: SHIPPED | OUTPATIENT
Start: 2022-01-18

## 2022-01-19 DIAGNOSIS — D73.5 SPLENIC INFARCTION: ICD-10-CM

## 2022-01-19 RX ORDER — CLOPIDOGREL BISULFATE 75 MG/1
TABLET ORAL
Qty: 90 TABLET | Refills: 1 | Status: SHIPPED | OUTPATIENT
Start: 2022-01-19 | End: 2022-04-26

## 2022-04-26 DIAGNOSIS — J30.2 SEASONAL ALLERGIES: ICD-10-CM

## 2022-04-26 DIAGNOSIS — D73.5 SPLENIC INFARCTION: ICD-10-CM

## 2022-04-26 RX ORDER — CLOPIDOGREL BISULFATE 75 MG/1
TABLET ORAL
Qty: 90 TABLET | Refills: 1 | Status: SHIPPED | OUTPATIENT
Start: 2022-04-26

## 2022-04-26 RX ORDER — MONTELUKAST SODIUM 10 MG/1
TABLET ORAL
Qty: 90 TABLET | Refills: 0 | Status: SHIPPED | OUTPATIENT
Start: 2022-04-26

## 2022-07-18 DIAGNOSIS — I25.83 CORONARY ARTERY DISEASE DUE TO LIPID RICH PLAQUE: ICD-10-CM

## 2022-07-18 DIAGNOSIS — E55.9 VITAMIN D DEFICIENCY: ICD-10-CM

## 2022-07-18 DIAGNOSIS — E04.9 ENLARGED THYROID: Primary | ICD-10-CM

## 2022-07-18 DIAGNOSIS — I25.10 CORONARY ARTERY DISEASE DUE TO LIPID RICH PLAQUE: ICD-10-CM

## 2022-07-18 DIAGNOSIS — E78.2 MIXED HYPERLIPIDEMIA: ICD-10-CM

## 2022-07-18 DIAGNOSIS — I10 ESSENTIAL HYPERTENSION: ICD-10-CM

## 2022-07-18 DIAGNOSIS — R73.01 ELEVATED FASTING GLUCOSE: ICD-10-CM

## 2022-07-18 DIAGNOSIS — E87.1 HYPONATREMIA: ICD-10-CM

## 2022-07-18 RX ORDER — ATORVASTATIN CALCIUM 40 MG/1
80 TABLET, FILM COATED ORAL DAILY
Qty: 180 TABLET | Refills: 1 | Status: SHIPPED | OUTPATIENT
Start: 2022-07-18 | End: 2022-09-12 | Stop reason: SDUPTHER

## 2022-08-02 ENCOUNTER — RA CDI HCC (OUTPATIENT)
Dept: OTHER | Facility: HOSPITAL | Age: 82
End: 2022-08-02

## 2022-08-02 NOTE — PROGRESS NOTES
[id number]  [facility] 75  coding opportunities          Chart Reviewed number of suggestions sent to Provider: 1     Patients Insurance     Medicare Insurance: [employer]

## 2022-08-17 DIAGNOSIS — J30.2 SEASONAL ALLERGIES: ICD-10-CM

## 2022-08-17 RX ORDER — MONTELUKAST SODIUM 10 MG/1
TABLET ORAL
Qty: 90 TABLET | Refills: 0 | Status: SHIPPED | OUTPATIENT
Start: 2022-08-17 | End: 2022-09-09

## 2022-09-06 ENCOUNTER — RA CDI HCC (OUTPATIENT)
Dept: OTHER | Facility: HOSPITAL | Age: 82
End: 2022-09-06

## 2022-09-06 NOTE — PROGRESS NOTES
[id number] noted 4/9/21  [facility] 75  coding opportunities          Chart Reviewed number of suggestions sent to Provider: 1     Patients Insurance     Medicare Insurance: [employer]

## 2022-09-09 DIAGNOSIS — J30.2 SEASONAL ALLERGIES: ICD-10-CM

## 2022-09-09 RX ORDER — MONTELUKAST SODIUM 10 MG/1
TABLET ORAL
Qty: 90 TABLET | Refills: 0 | Status: SHIPPED | OUTPATIENT
Start: 2022-09-09

## 2022-09-12 DIAGNOSIS — E78.2 MIXED HYPERLIPIDEMIA: ICD-10-CM

## 2022-09-12 RX ORDER — ATORVASTATIN CALCIUM 40 MG/1
80 TABLET, FILM COATED ORAL DAILY
Qty: 180 TABLET | Refills: 1 | Status: SHIPPED | OUTPATIENT
Start: 2022-09-12

## 2022-09-12 RX ORDER — ATORVASTATIN CALCIUM 40 MG/1
80 TABLET, FILM COATED ORAL DAILY
Qty: 180 TABLET | Refills: 1 | Status: SHIPPED | OUTPATIENT
Start: 2022-09-12 | End: 2022-09-12 | Stop reason: SDUPTHER

## 2022-09-13 LAB
25(OH)D3+25(OH)D2 SERPL-MCNC: 29 NG/ML (ref 30–100)
ALBUMIN SERPL-MCNC: 4.3 G/DL (ref 3.7–4.7)
ALBUMIN/GLOB SERPL: 1.7 {RATIO} (ref 1.2–2.2)
ALP SERPL-CCNC: 113 IU/L (ref 44–121)
ALT SERPL-CCNC: 21 IU/L (ref 0–32)
AST SERPL-CCNC: 26 IU/L (ref 0–40)
BASOPHILS # BLD AUTO: 0 X10E3/UL (ref 0–0.2)
BASOPHILS NFR BLD AUTO: 0 %
BILIRUB SERPL-MCNC: 0.6 MG/DL (ref 0–1.2)
BUN SERPL-MCNC: 8 MG/DL (ref 8–27)
BUN/CREAT SERPL: 11 (ref 12–28)
CALCIUM SERPL-MCNC: 9.2 MG/DL (ref 8.7–10.3)
CHLORIDE SERPL-SCNC: 95 MMOL/L (ref 96–106)
CHOLEST SERPL-MCNC: 154 MG/DL (ref 100–199)
CO2 SERPL-SCNC: 26 MMOL/L (ref 20–29)
CREAT SERPL-MCNC: 0.75 MG/DL (ref 0.57–1)
EGFR: 80 ML/MIN/1.73
EOSINOPHIL # BLD AUTO: 0.2 X10E3/UL (ref 0–0.4)
EOSINOPHIL NFR BLD AUTO: 4 %
ERYTHROCYTE [DISTWIDTH] IN BLOOD BY AUTOMATED COUNT: 11.9 % (ref 11.7–15.4)
EST. AVERAGE GLUCOSE BLD GHB EST-MCNC: 114 MG/DL
GLOBULIN SER-MCNC: 2.6 G/DL (ref 1.5–4.5)
GLUCOSE SERPL-MCNC: 97 MG/DL (ref 65–99)
HBA1C MFR BLD: 5.6 % (ref 4.8–5.6)
HCT VFR BLD AUTO: 38.7 % (ref 34–46.6)
HDLC SERPL-MCNC: 64 MG/DL
HGB BLD-MCNC: 13.4 G/DL (ref 11.1–15.9)
IMM GRANULOCYTES # BLD: 0 X10E3/UL (ref 0–0.1)
IMM GRANULOCYTES NFR BLD: 0 %
LDLC SERPL CALC-MCNC: 79 MG/DL (ref 0–99)
LYMPHOCYTES # BLD AUTO: 1.2 X10E3/UL (ref 0.7–3.1)
LYMPHOCYTES NFR BLD AUTO: 21 %
MCH RBC QN AUTO: 33.3 PG (ref 26.6–33)
MCHC RBC AUTO-ENTMCNC: 34.6 G/DL (ref 31.5–35.7)
MCV RBC AUTO: 96 FL (ref 79–97)
MONOCYTES # BLD AUTO: 0.5 X10E3/UL (ref 0.1–0.9)
MONOCYTES NFR BLD AUTO: 9 %
NEUTROPHILS # BLD AUTO: 3.7 X10E3/UL (ref 1.4–7)
NEUTROPHILS NFR BLD AUTO: 66 %
PLATELET # BLD AUTO: 246 X10E3/UL (ref 150–450)
POTASSIUM SERPL-SCNC: 4.4 MMOL/L (ref 3.5–5.2)
PROT SERPL-MCNC: 6.9 G/DL (ref 6–8.5)
RBC # BLD AUTO: 4.02 X10E6/UL (ref 3.77–5.28)
SL AMB VLDL CHOLESTEROL CALC: 11 MG/DL (ref 5–40)
SODIUM SERPL-SCNC: 131 MMOL/L (ref 134–144)
TRIGL SERPL-MCNC: 53 MG/DL (ref 0–149)
TSH SERPL DL<=0.005 MIU/L-ACNC: 2.7 UIU/ML (ref 0.45–4.5)
WBC # BLD AUTO: 5.6 X10E3/UL (ref 3.4–10.8)

## 2022-09-14 ENCOUNTER — OFFICE VISIT (OUTPATIENT)
Dept: FAMILY MEDICINE CLINIC | Facility: CLINIC | Age: 82
End: 2022-09-14
Payer: MEDICARE

## 2022-09-14 VITALS
OXYGEN SATURATION: 96 % | SYSTOLIC BLOOD PRESSURE: 168 MMHG | RESPIRATION RATE: 16 BRPM | DIASTOLIC BLOOD PRESSURE: 88 MMHG | HEIGHT: 67 IN | WEIGHT: 169 LBS | TEMPERATURE: 97.9 F | HEART RATE: 76 BPM | BODY MASS INDEX: 26.53 KG/M2

## 2022-09-14 DIAGNOSIS — Z00.00 MEDICARE ANNUAL WELLNESS VISIT, SUBSEQUENT: ICD-10-CM

## 2022-09-14 DIAGNOSIS — E55.9 VITAMIN D DEFICIENCY: ICD-10-CM

## 2022-09-14 DIAGNOSIS — I83.813 VARICOSE VEINS OF BILATERAL LOWER EXTREMITIES WITH PAIN: ICD-10-CM

## 2022-09-14 DIAGNOSIS — J38.00 VOCAL CORD PARESIS: ICD-10-CM

## 2022-09-14 DIAGNOSIS — M54.6 CHRONIC MIDLINE THORACIC BACK PAIN: ICD-10-CM

## 2022-09-14 DIAGNOSIS — I10 ESSENTIAL HYPERTENSION: Primary | ICD-10-CM

## 2022-09-14 DIAGNOSIS — M35.09 SJOGREN'S SYNDROME WITH OTHER ORGAN INVOLVEMENT (HCC): ICD-10-CM

## 2022-09-14 DIAGNOSIS — Z98.890 HISTORY OF CAROTID ENDARTERECTOMY: ICD-10-CM

## 2022-09-14 DIAGNOSIS — E28.310 SYMPTOMATIC PREMATURE MENOPAUSE: ICD-10-CM

## 2022-09-14 DIAGNOSIS — Z13.820 SCREENING FOR OSTEOPOROSIS: ICD-10-CM

## 2022-09-14 DIAGNOSIS — L40.9 PSORIASIS OF SCALP: ICD-10-CM

## 2022-09-14 DIAGNOSIS — I74.10 SHAGGY AORTA SYNDROME (HCC): ICD-10-CM

## 2022-09-14 DIAGNOSIS — G89.29 CHRONIC MIDLINE THORACIC BACK PAIN: ICD-10-CM

## 2022-09-14 DIAGNOSIS — E87.1 HYPONATREMIA: ICD-10-CM

## 2022-09-14 PROCEDURE — 99214 OFFICE O/P EST MOD 30 MIN: CPT | Performed by: NURSE PRACTITIONER

## 2022-09-14 PROCEDURE — G0439 PPPS, SUBSEQ VISIT: HCPCS | Performed by: NURSE PRACTITIONER

## 2022-09-14 RX ORDER — CLOBETASOL PROPIONATE 0.46 MG/ML
SOLUTION TOPICAL 2 TIMES DAILY
Qty: 150 ML | Refills: 1 | Status: SHIPPED | OUTPATIENT
Start: 2022-09-14

## 2022-09-14 NOTE — PROGRESS NOTES
Assessment and Plan:   Pt is a [de-identified] yr old female  presents in office for follow up multiple diagnoses   Also do for medicare wellness   Underlying HTN - still not well controlled but much better then before she has history of BP in the 200s   She has white coat syndrome   States at home Bps in the 120s /80s  CAD - she is being monitored by Vascular specialist for carotid entasis and stents in the past   Does have some voice changes and vocal cord paralysis since    hyponatremia - stable in the low 130s she does see Nephrology   She is due for Dexa scan   I have ordered her for PT for upper back pain   Labs reviewed   Follow up in 4 months   Problem List Items Addressed This Visit        Cardiovascular and Mediastinum    Essential hypertension - Primary    Relevant Orders    Comprehensive metabolic panel    CBC and differential    TSH, 3rd generation    Lipid panel    UA (URINE) with reflex to Scope    Vitamin D 25 hydroxy    Magnesium    Varicose veins of bilateral lower extremities with pain    Relevant Orders    Comprehensive metabolic panel    CBC and differential    TSH, 3rd generation    Lipid panel    UA (URINE) with reflex to Scope    Vitamin D 25 hydroxy    Magnesium    Shaggy aorta syndrome (HCC)    Relevant Orders    Comprehensive metabolic panel    CBC and differential    TSH, 3rd generation    Lipid panel    UA (URINE) with reflex to Scope    Vitamin D 25 hydroxy    Magnesium       Other    History of carotid endarterectomy    Relevant Orders    Comprehensive metabolic panel    CBC and differential    TSH, 3rd generation    Lipid panel    UA (URINE) with reflex to Scope    Vitamin D 25 hydroxy    Magnesium    Hyponatremia    Relevant Orders    Comprehensive metabolic panel    CBC and differential    TSH, 3rd generation    Lipid panel    UA (URINE) with reflex to Scope    Vitamin D 25 hydroxy    Magnesium    Vitamin D deficiency    Relevant Orders    Comprehensive metabolic panel    CBC and differential TSH, 3rd generation    Lipid panel    UA (URINE) with reflex to Scope    Vitamin D 25 hydroxy    Magnesium    DXA bone density spine hip and pelvis    Vocal fold paresis - left, severe    Relevant Orders    Comprehensive metabolic panel    CBC and differential    TSH, 3rd generation    Lipid panel    UA (URINE) with reflex to Scope    Vitamin D 25 hydroxy    Magnesium    Sjogren's syndrome with other organ involvement (HCC)    Relevant Orders    Comprehensive metabolic panel    CBC and differential    TSH, 3rd generation    Lipid panel    UA (URINE) with reflex to Scope    Vitamin D 25 hydroxy    Magnesium      Other Visit Diagnoses     Medicare annual wellness visit, subsequent        Relevant Orders    Comprehensive metabolic panel    CBC and differential    TSH, 3rd generation    Lipid panel    UA (URINE) with reflex to Scope    Vitamin D 25 hydroxy    Magnesium    Psoriasis of scalp        Relevant Medications    clobetasol (TEMOVATE) 0 05 % external solution    Other Relevant Orders    Comprehensive metabolic panel    CBC and differential    TSH, 3rd generation    Lipid panel    UA (URINE) with reflex to Scope    Vitamin D 25 hydroxy    Magnesium    Screening for osteoporosis        Relevant Orders    DXA bone density spine hip and pelvis    Symptomatic premature menopause         Relevant Orders    DXA bone density spine hip and pelvis    Chronic midline thoracic back pain        Relevant Orders    Ambulatory Referral to Physical Therapy        BMI Counseling: Body mass index is 26 47 kg/m²  The BMI is above normal  Nutrition recommendations include decreasing portion sizes, encouraging healthy choices of fruits and vegetables, decreasing fast food intake, consuming healthier snacks, limiting drinks that contain sugar, moderation in carbohydrate intake, increasing intake of lean protein, reducing intake of saturated and trans fat and reducing intake of cholesterol   Exercise recommendations include vigorous physical activity 75 minutes/week, exercising 3-5 times per week and strength training exercises  No pharmacotherapy was ordered  Patient referred to PCP  Rationale for BMI follow-up plan is due to patient being overweight or obese  Depression Screening and Follow-up Plan: Patient was screened for depression during today's encounter  They screened negative with a PHQ-2 score of 2  Falls Plan of Care: balance, strength, and gait training instructions were provided and referral to physical therapy  Recommended assistive device to help with gait and balance  Medications that increase falls were reviewed  Assessed feet and footwear  Vitamin D supplementation was recommended  Home safety evaluation by OT recommended  Home safety education provided  Urinary Incontinence Plan of Care: counseling topics discussed: practice Kegel (pelvic floor strengthening) exercises, use restroom every 2 hours, limit alcohol, caffeine, spicy foods, and acidic foods, keeping a bladder diary, limiting fluid intake 3-4 hours before bed, weight loss, preventing constipation, taking fluid pills at a time when you can get to bathroom easily, limiting fluid intake to 60 oz  per day and bladder retraining  Preventive health issues were discussed with patient, and age appropriate screening tests were ordered as noted in patient's After Visit Summary  Personalized health advice and appropriate referrals for health education or preventive services given if needed, as noted in patient's After Visit Summary       History of Present Illness:     Patient presents for a Medicare Wellness Visit    Pt is a [de-identified] yr old female  presents in office for follow up multiple diagnoses   Also do for medicare wellness   Underlying HTN - still not well controlled but much better then before she has history of BP in the 200s   She has white coat syndrome   States at home Bps in the 120s /80s  CAD - she is being monitored by Vascular specialist for carotid entasis and stents in the past   Does have some voice changes and vocal cord paralysis since    hyponatremia - stable in the low 130s she does see Nephrology   She is due for Dexa scan   I have ordered her for PT for upper back pain   Labs reviewed   Follow up in 4 months        Patient Care Team:  Yariel Lee as PCP - General (Nurse Practitioner)     Review of Systems:     Review of Systems   Constitutional: Negative for chills, fatigue and fever  HENT: Positive for trouble swallowing  Negative for congestion, postnasal drip and sore throat  Swallowing issues   Does have some some vocal paralysis    Eyes: Negative for pain, discharge and itching  Respiratory: Negative for cough and shortness of breath  Cardiovascular: Negative for chest pain and leg swelling  HTN - labetalol was increased to three times a day   Under care of Cardiology   hyperlipidemia    in the past    Gastrointestinal: Negative for abdominal distention and abdominal pain  Endocrine: Positive for cold intolerance  Genitourinary: Negative for difficulty urinating, dysuria and flank pain  History of Na being low under care of Nephrology    Musculoskeletal: Positive for back pain (upper back pain ) and myalgias  Skin: Negative for rash (still has some scalp erythema but much better )  Left upper back sertraline for removal of squamous cell lesion noted currently doing dressings and she has follow-up with Dermatology in about 1-2 weeks   Allergic/Immunologic: Positive for environmental allergies  Neurological: Negative for dizziness, weakness and headaches  Hematological: Negative for adenopathy  Psychiatric/Behavioral: Negative for sleep disturbance and suicidal ideas  The patient is not nervous/anxious           Problem List:     Patient Active Problem List   Diagnosis    Dysphonia    Pharyngoesophageal dysphagia    Seasonal allergic rhinitis    History of tobacco use    Muscle tension dysphonia    Glottic insufficiency    Paralysis of left vocal fold    History of carotid endarterectomy    TULIO positive    Essential hypertension    Hyponatremia    Mixed hyperlipidemia    Varicose veins of bilateral lower extremities with pain    Vitamin D deficiency    Vocal fold paresis - left, severe    Atheroscler 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 ([address] )    Shaggy aorta syndrome (HCC)    Edema of lower extremity    Sjogren's syndrome with other organ involvement [facility])      Past Medical and Surgical History:     Past Medical History:   Diagnosis Date    Allergic rhinitis     Anxiety disorder     Arthritis     Dizziness     Dry eye syndrome     Essential hypertension, malignant     Pure hypercholesterolemia     Splenic infarction      Past Surgical History:   Procedure Laterality Date    CARDIAC SURGERY  2019    Blocked Carotid Artery    COSMETIC SURGERY      Scalp with 150 stitches    EYE SURGERY      SINUS SURGERY      THROAT SURGERY      Removal of fishbone      Family History:     Family History   Problem Relation Age of Onset    Heart disease Mother     Other Sister         asbestos poisoning    Other Maternal Grandfather         cardiomegaly    Cancer Maternal Aunt         ovarian      Social History:     Social History     Socioeconomic History    Marital status: /Civil Union     Spouse name: None    Number of children: None    Years of education: None    Highest education level: None   Occupational History    None   Tobacco Use    Smoking status: Former Smoker     Packs/day: 0 50     Years: 25 00     Pack years: 12 50     Types: Cigarettes     Quit date:      Years since quittin 7    Smokeless tobacco: Never Used   Vaping Use    Vaping Use: Never used   Substance and Sexual Activity    Alcohol use: Yes     Comment: Socially    Drug use: Never    Sexual activity: None   Other Topics Concern    None   Social History Narrative    · Most recent tobacco use screenin2019      · Do you currently or have you served in the [employer]:   No      · Were you activated, into active duty, as a member of the [employer] or as a Reservist:   No     Social Determinants of Health     Financial Resource Strain: Medium Risk    Difficulty of Paying Living Expenses: Somewhat hard   Food Insecurity: Not on file   Transportation Needs: No Transportation Needs    Lack of Transportation (Medical): No    Lack of Transportation (Non-Medical): No   Physical Activity: Not on file   Stress: Not on file   Social Connections: Not on file   Intimate Partner Violence: Not on file   Housing Stability: Not on file      Medications and Allergies:     Current Outpatient Medications   Medication Sig Dispense Refill    ascorbic acid (VITAMIN C) 1000 MG tablet Take 1,000 mg by mouth      Aspirin Low Dose 81 MG EC tablet Take 1 tablet (81 mg total) by mouth daily 90 tablet 1    atorvastatin (LIPITOR) 40 mg tablet Take 2 tablets (80 mg total) by mouth daily 180 tablet 1    azelastine (ASTELIN) 0 1 % nasal spray 1 spray into each nostril 2 (two) times a day 30 mL 11    B Complex Vitamins (B COMPLEX 1 PO) Take 2 tablets by mouth daily Each tablet is 40mg- Patient is taking liquid form        clobetasol (TEMOVATE) 0 05 % external solution Apply topically 2 (two) times a day To scalp for psoriasis 150 mL 1    clopidogrel (PLAVIX) 75 mg tablet TAKE 1 TABLET BY MOUTH EVERY DAY 90 tablet 1    cycloSPORINE (RESTASIS) 0 05 % ophthalmic emulsion Apply 1 drop to eye 2 (two) times a day      fluticasone (FLONASE) 50 mcg/act nasal spray SPRAY 1 SPRAY INTO EACH NOSTRIL EVERY DAY 48 mL 1    losartan (COZAAR) 100 MG tablet TAKE 1 TABLET BY MOUTH EVERY DAY 90 tablet 4    montelukast (SINGULAIR) 10 mg tablet TAKE 1 TABLET BY MOUTH EVERYDAY AT BEDTIME 90 tablet 0    multivitamin (THERAGRAN) TABS Take 1 tablet by mouth daily      olopatadine HCl (PATADAY) 0 2 % opth drops Apply 1 drop to eye 2 (two) times a day      Omega-3 Fatty Acids (fish oil) 1,000 mg Take 2 g by mouth 2 (two) times a day      White Petrolatum-Mineral Oil (artificial tears) Administer 1 application to both eyes as needed for dry eyes 1 drop in each eye      labetalol (NORMODYNE) 100 mg tablet TAKE 2 TABLETS (200 MG TOTAL) BY MOUTH EVERY 8 (EIGHT) HOURS 540 tablet 1     No current facility-administered medications for this visit  Allergies   Allergen Reactions    Potassium Chloride Other (See Comments)    Xanax [Alprazolam]     Latex Rash      Immunizations:     Immunization History   Administered Date(s) Administered    COVID-19 MODERNA VACC 0 5 ML IM 02/05/2021, 03/05/2021, 12/21/2021    INFLUENZA 11/01/2018, 11/20/2018, 09/13/2020, 10/14/2021    Influenza Split High Dose Preservative Free IM 10/30/2017, 11/01/2019, 09/13/2020    Influenza, high dose seasonal 0 7 mL 09/13/2020    Influenza, seasonal, injectable 12/20/2000, 09/29/2021    Pneumococcal Conjugate 13-Valent 12/05/2018    Pneumococcal Polysaccharide PPV23 12/01/2018      Health Maintenance:         Topic Date Due    Hepatitis C Screening  Completed         Topic Date Due    COVID-19 Vaccine (4 - Booster for Moderna series) 04/21/2022    Influenza Vaccine (1) 09/01/2022      Medicare Screening Tests and Risk Assessments:     Mukesh Hermosillo is here for her Subsequent Wellness visit  Historian  Patient cannot answer questions due to cognitive impairment, intelluctual disability, or expressive limitations  Health Risk Assessment:   Patient rates overall health as fair  Patient feels that their physical health rating is same  Patient is dissatisfied with their life  Eyesight was rated as same  Hearing was rated as same  Patient feels that their emotional and mental health rating is slightly better  Patients states they are sometimes angry  Patient states they are sometimes unusually tired/fatigued   Pain experienced in the last 7 days has been some  Patient's pain rating has been 8/10  Patient states that she has experienced no weight loss or gain in last 6 months  Depression Screening:   PHQ-2 Score: 2      Fall Risk Screening: In the past year, patient has experienced: no history of falling in past year      Urinary Incontinence Screening:   Patient has not leaked urine accidently in the last six months  Home Safety:  Patient does not have trouble with stairs inside or outside of their home  Patient has working smoke alarms and has working carbon monoxide detector  Home safety hazards include: none  Nutrition:   Current diet is Regular  Medications:   Patient is currently taking over-the-counter supplements  OTC medications include: see medication list  Patient is able to manage medications  Activities of Daily Living (ADLs)/Instrumental Activities of Daily Living (IADLs):   Walk and transfer into and out of bed and chair?: Yes  Dress and groom yourself?: Yes    Bathe or shower yourself?: Yes    Feed yourself?  Yes  Do your laundry/housekeeping?: Yes  Manage your money, pay your bills and track your expenses?: Yes  Make your own meals?: Yes    Do your own shopping?: Yes    Previous Hospitalizations:   Any hospitalizations or ED visits within the last 12 months?: No      Advance Care Planning:   Living will: Yes    Advanced directive: Yes      Cognitive Screening:   Provider or family/friend/caregiver concerned regarding cognition?: No    PREVENTIVE SCREENINGS      Cardiovascular Screening:    General: Screening Not Indicated and History Lipid Disorder      Diabetes Screening:     General: Screening Current      Colorectal Cancer Screening:     General: Risks and Benefits Discussed    Due for: Cologuard      Breast Cancer Screening:     General: Risks and Benefits Discussed    Due for: Mammogram        Cervical Cancer Screening:    General: Screening Not Indicated      Osteoporosis Screening: General: Risks and Benefits Discussed    Due for: DXA Axial      Lung Cancer Screening:     General: Screening Not Indicated      Hepatitis C Screening:    General: Screening Current    Screening, Brief Intervention, and Referral to Treatment (SBIRT)    Screening  Typical number of drinks in a day: 0  Typical number of drinks in a week: 0  Interpretation: Low risk drinking behavior  Single Item Drug Screening:  How often have you used an illegal drug (including marijuana) or a prescription medication for non-medical reasons in the past year? never    Single Item Drug Screen Score: 0  Interpretation: Negative screen for possible drug use disorder    Other Counseling Topics:   Car/seat belt/driving safety, skin self-exam, sunscreen and calcium and vitamin D intake and regular weightbearing exercise  No exam data present     Physical Exam:     /88 (BP Location: Left arm, Patient Position: Sitting, Cuff Size: Adult)   Pulse 76   Temp 97 9 °F (36 6 °C)   Resp 16   Ht 5' 7" (1 702 m)   Wt 76 7 kg (169 lb)   SpO2 96%   BMI 26 47 kg/m²     Physical Exam  Vitals and nursing note reviewed  Constitutional:       Appearance: Normal appearance  Comments: BMI 26 47   HENT:      Head: Atraumatic  Right Ear: Tympanic membrane normal       Left Ear: Tympanic membrane normal    Eyes:      Extraocular Movements: Extraocular movements intact  Cardiovascular:      Rate and Rhythm: Normal rate and regular rhythm  Heart sounds: Normal heart sounds  Pulmonary:      Breath sounds: Normal breath sounds  Abdominal:      General: Abdomen is flat  Palpations: Abdomen is soft  Musculoskeletal:         General: Normal range of motion  Cervical back: Normal range of motion  Skin:     General: Skin is warm  Capillary Refill: Capillary refill takes less than 2 seconds  Neurological:      Mental Status: She is alert and oriented to person, place, and time     Psychiatric:         Mood and Affect: Mood normal          Behavior: Behavior normal           Contains abnormal data Vitamin D 25 hydroxy  Order: [id number]   Status: Final result     Visible to patient: No (inaccessible in [facility])     Next appt: None     0 Result Notes    Component Ref Range & Units 9/12/22  7:28 AM    25-HYDROXY VIT D 30 0 - 100 0 ng/mL 29 0 Low        TSH, 3rd generation  Order: [id number]   Status: Final result     Visible to patient: No (inaccessible in [facility])     Next appt: None     0 Result Notes    Component Ref Range & Units 9/12/22  7:28 AM 11/24/21  7:15 AM 4/5/21  7:37 AM 12/14/20 11:59 AM 9/9/20  7:12 AM 11/13/19  7:14 AM   TSH 0 450 - 4 500 uIU/mL 2 700  1 690             Hemoglobin A1C  Order: [id number]   Status: Final result     Visible to patient: No (inaccessible in [facility])     Next appt: None     0 Result Notes    Component Ref Range & Units 9/12/22  7:28 AM    Hemoglobin A1C 4 8 - 5 6 % 5 6    Comment:          Prediabetes: 5 7 - 6 4            Diabetes: >6 4            Glycemic control for adults with diabetes: <7 0    Estimated Average Glucose mg/dL 114         Lipid panel  Order: [id number]   Status: Final result     Visible to patient: No (inaccessible in [facility])     Next appt: None     0 Result Notes    Component Ref Range & Units 9/12/22  7:28 AM 11/24/21  7:15 AM 4/5/21  7:37 AM 12/14/20 11:59 AM 9/9/20  7:12 AM   Cholesterol, Total 100 - 199 mg/dL 154  155  137  139  141    Triglycerides 0 - 149 mg/dL 53  40  45  55  47    HDL >39 mg/dL 64  63  63  60  62    VLDL Cholesterol Calculated 5 - 40 mg/dL 11  9  10  12  11    LDL Calculated 0 - 99 mg/dL 79  83  64  67  68              Narrative    Performed at: [address]  [id number]   : Axel Canas MD, Phone:  [phone]      Specimen Collected: 09/12/22  7:28 AM Last Resulted: 09/13/22  8:05 AM              Contains abnormal data Comprehensive metabolic panel  Order: [id number]   Status: Final result     Visible to patient: No (inaccessible in [facility])     Next appt: None     0 Result Notes    Component Ref Range & Units 9/12/22  7:28 AM 11/24/21  7:15 AM 4/5/21  7:37 AM 12/14/20 11:59 AM 9/9/20  7:12 AM   Glucose, Random 65 - 99 mg/dL 97  99  98  100 High   97    BUN 8 - 27 mg/dL 8  7 Low   8  12  11    Creatinine 0 57 - 1 00 mg/dL 0 75  0 79  0 75  0 84  0 83    eGFR >59 mL/min/1 73 80        SL AMB BUN/CREATININE RATIO 12 - 28 11 Low   9 Low   11 Low   14  13    Sodium 134 - 144 mmol/L 131 Low   131 Low   129 Low   127 Low   128 Low     Potassium 3 5 - 5 2 mmol/L 4 4  4 5  4 6  5 0  4 3    Chloride 96 - 106 mmol/L 95 Low   93 Low   92 Low   93 Low   91 Low     CO2 20 - 29 mmol/L 26  26  25  23  27    CALCIUM 8 7 - 10 3 mg/dL 9 2  9 4  8 8  9 0  9 2    Protein, Total 6 0 - 8 5 g/dL 6 9  6 8  6 8  6 7  6 9    Albumin 3 7 - 4 7 g/dL 4 3  4 0  4 0  4 2  4 1    Globulin, Total 1 5 - 4 5 g/dL 2 6  2 8  2 8  2 5  2 8    Albumin/Globulin Ratio 1 2 - 2 2 1 7  1 4  1 4  1 7  1 5    TOTAL BILIRUBIN 0 0 - 1 2 mg/dL 0 6  0 6  0 5  0 6  0 5    Alk Phos Isoenzymes 44 - 121 IU/L 113  102 CM  110 R  123 High  R  106 R    AST 0 - 40 IU/L 26  25  27  39  30    ALT 0 - 32 IU/L 21  20  27  38 High   25              Narrative    Performed at: [address] [place] [address]  [id number]   : Roxanna Luna MD, Phone:  [phone]      Specimen Collected: 09/12/22  7:28 AM Last Resulted: 09/13/22  8:05 AM           Contains abnormal data CBC and differential  Order: [id number]   Status: Final result     Visible to patient: No (inaccessible in [facility])     Next appt: None     0 Result Notes    Component Ref Range & Units 9/12/22  7:28 AM 11/24/21  7:15 AM 4/5/21  7:37 AM 12/14/20 11:59 AM 9/9/20  7:12 AM   White Blood Cell Count 3 4 - 10 8 x10E3/uL 5 6  5 0  5 4  5 5  4 8    Red Blood Cell Count 3 77 - 5 28 x10E6/uL 4 02  3 85 3  79  3 84  3 84    Hemoglobin 11 1 - 15 9 g/dL 13 4  12 3  12 1  12 7  12 8    HCT 34 0 - 46 6 % 38 7  36 8  35 9  35 8  37 1    MCV 79 - 97 fL 96  96  95  93  97    MCH 26 6 - 33 0 pg 33 3 High   31 9  31 9  33 1 High   33 3 High     MCHC 31 5 - 35 7 g/dL 34 6  33 4  33 7  35 5  34 5    RDW 11 7 - 15 4 % 11 9  11 8  12 1  12 1  11 7    Platelet Count 557 - 450 x10E3/uL 246  279  250  256  232    Neutrophils Not Estab  % 66  66  61  69  62    Lymphocytes Not Estab  % 21  21  21  18  21    Monocytes Not Estab  % 9  9  11  9  11    Eosinophils Not Estab  % 4  3  6  3  5    Basophils PCT Not Estab  % 0  1  1  1  1    Neutrophils (Absolute) 1 4 - 7 0 x10E3/uL 3 7  3 3  3 3  3 8  3 0    Lymphocytes (Absolute) 0 7 - 3 1 x10E3/uL 1 2  1 0  1 1  1 0  1 0    Monocytes (Absolute) 0 1 - 0 9 x10E3/uL 0 5  0 4  0 6  0 5  0 5    Eosinophils (Absolute) 0 0 - 0 4 x10E3/uL 0 2  0 1  0 3  0 2  0 2    Basophils ABS 0 0 - 0 2 x10E3/uL 0 0  0 0  0 0  0 0  0 0    Immature Granulocytes Not Estab  % 0  0  0  0  0    Immature Granulocytes (Absolute) 0 0 - 0 1 x10E3/uL 0 0  0 0  0 0  0 0  0 0              Narrative    Performed at: [address]  [id number]   : Rica Barrios MD, Phone:  [phone]      Specimen Collected: 09/12/22  7:28 AM Last Resulted: 09/13/22  8:05 AM                  KRISTINA Kidd

## 2022-10-13 ENCOUNTER — EVALUATION (OUTPATIENT)
Dept: PHYSICAL THERAPY | Facility: CLINIC | Age: 82
End: 2022-10-13
Payer: MEDICARE

## 2022-10-13 DIAGNOSIS — G89.29 CHRONIC MIDLINE THORACIC BACK PAIN: ICD-10-CM

## 2022-10-13 DIAGNOSIS — M54.6 CHRONIC MIDLINE THORACIC BACK PAIN: ICD-10-CM

## 2022-10-13 PROCEDURE — 97110 THERAPEUTIC EXERCISES: CPT

## 2022-10-13 PROCEDURE — 97161 PT EVAL LOW COMPLEX 20 MIN: CPT
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
## 2022-10-17 ENCOUNTER — OFFICE VISIT (OUTPATIENT)
Dept: PHYSICAL THERAPY | Facility: CLINIC | Age: 82
End: 2022-10-17
Payer: MEDICARE

## 2022-10-17 DIAGNOSIS — M54.6 CHRONIC MIDLINE THORACIC BACK PAIN: Primary | ICD-10-CM

## 2022-10-17 DIAGNOSIS — G89.29 CHRONIC MIDLINE THORACIC BACK PAIN: Primary | ICD-10-CM

## 2022-10-17 PROCEDURE — 97110 THERAPEUTIC EXERCISES: CPT

## 2022-10-17 NOTE — PROGRESS NOTES
Daily Note     Today's date: 10/17/2022  Patient name: Audra Lopez  : 1940  MRN: [id number]  Referring provider: KRISTINA Fry  Dx:   Encounter Diagnosis     ICD-10-CM    1  Chronic midline thoracic back pain  M54 6     G89 29                   Subjective: Pt states she is feeling okay after IE and doing her HEP      Objective: See treatment diary below      Assessment: Pt with good tolerance to tx  Reports feeling "looser"  Cueing required throughout for proper technique and dosing  Pt responds well to verbal and visual cueing for form  Pt does exhibit improved posture by end of session  Plan to assess response next session and progress as able  Plan: Continue per plan of care  Progress treatment as tolerated  Precautions: HTN, cardiac hx, dysphonia, dysphagia, Sjogrens      RE: 11/10  EPOC:              Manuals 10/13 10/17                                                               Neuro Re-Ed                                                                                                        Ther Ex             Bike/UBE  UBE 4' retro           scap retraction 5"x20 5"x20           T/S ext 10"x10 W/ ball 5"x20           T/S rot 10"x10 10"x10           Lumbar rollout  10"x10 3 way           Row/LPD  YTB 2x10 ea           Open book?              Modified thread the needle  5"x10 ea                                                  Pt edu MS            HEP MS            Ther Activity                                       Gait Training                                       Modalities

## 2022-10-20 ENCOUNTER — OFFICE VISIT (OUTPATIENT)
Dept: PHYSICAL THERAPY | Facility: CLINIC | Age: 82
End: 2022-10-20
Payer: MEDICARE

## 2022-10-20 DIAGNOSIS — M54.6 CHRONIC MIDLINE THORACIC BACK PAIN: Primary | ICD-10-CM

## 2022-10-20 DIAGNOSIS — G89.29 CHRONIC MIDLINE THORACIC BACK PAIN: Primary | ICD-10-CM

## 2022-10-20 PROCEDURE — 97110 THERAPEUTIC EXERCISES: CPT

## 2022-10-20 NOTE — PROGRESS NOTES
Daily Note     Today's date: 10/20/2022  Patient name: Quang Lucero  : 1940  MRN: [id number]  Referring provider: KRISTINA Medina  Dx:   Encounter Diagnosis     ICD-10-CM    1  Chronic midline thoracic back pain  M54 6     G89 29                   Subjective: Patient reports feeling very good after her PT session  She feels "stiffened up" today, and notes she has not been compliant with her HEP  Objective: See treatment diary below      Assessment: Tolerated treatment well  Cont with outlined program with only mild discomfort on R side  Patient felt improved mobility by end of session    Progressed in resistance with rows  Bilateral ER added for postural strengthening  Discussed performance of HEP and accountability  No update to HEP this date, assess compliance NV  Patient demonstrated fatigue post treatment and would benefit from continued PT      Plan: Progress treatment as tolerated         Precautions: HTN, cardiac hx, dysphonia, dysphagia, Sjogrens      RE: 11/10  EPOC:              Manuals 10/13 10/17 10/20                                                              Neuro Re-Ed                                                                                                        Ther Ex             Bike/UBE  UBE 4' retro UBE 4' retro          scap retraction 5"x20 5"x20 5"x20          T/S ext 10"x10 W/ ball 5"x20 W/ ball 5" x20           T/S rot 10"x10 10"x10 10"x10 bilat           Lumbar rollout  10"x10 3 way 10"x10 3 way          Row/LPD  YTB 2x10 ea Rows GTB/  LPD YTB   2x10 ea          Bilateral ER   15x YTB           H-abd    NV          Open book   10"x10 bilat  Top leg on foam roll          Modified thread the needle  5"x10 ea 5"x10 ea           Pec stretch    NV                                   Pt edu MS            HEP MS            Ther Activity                                       Gait Training                                       Modalities

## 2022-10-26 ENCOUNTER — OFFICE VISIT (OUTPATIENT)
Dept: PHYSICAL THERAPY | Facility: CLINIC | Age: 82
End: 2022-10-26
Payer: MEDICARE

## 2022-10-26 DIAGNOSIS — M54.6 CHRONIC MIDLINE THORACIC BACK PAIN: Primary | ICD-10-CM

## 2022-10-26 DIAGNOSIS — G89.29 CHRONIC MIDLINE THORACIC BACK PAIN: Primary | ICD-10-CM

## 2022-10-26 PROCEDURE — 97110 THERAPEUTIC EXERCISES: CPT

## 2022-10-28 ENCOUNTER — OFFICE VISIT (OUTPATIENT)
Dept: PHYSICAL THERAPY | Facility: CLINIC | Age: 82
End: 2022-10-28
Payer: MEDICARE

## 2022-10-28 DIAGNOSIS — M54.6 CHRONIC MIDLINE THORACIC BACK PAIN: Primary | ICD-10-CM

## 2022-10-28 DIAGNOSIS — G89.29 CHRONIC MIDLINE THORACIC BACK PAIN: Primary | ICD-10-CM

## 2022-10-28 PROCEDURE — 97110 THERAPEUTIC EXERCISES: CPT

## 2022-10-28 NOTE — PROGRESS NOTES
Daily Note     Today's date: 10/28/2022  Patient name: Tremayne Wyatt  : 1940  MRN: [id number]  Referring provider: KRISTINA Aguirre  Dx:   Encounter Diagnosis     ICD-10-CM    1  Chronic midline thoracic back pain  M54 6     G89 29                   Subjective: Pt states she had some discomfort after last session but overall feels she is moving in the right direction  Objective: See treatment diary below      Assessment: Tolerated treatment well  Patient demonstrated fatigue post treatment, exhibited good technique with therapeutic exercises and would benefit from continued PT  Pt reports improved mobility/feeling looser post-treatment  Program not progressed this date 2* increased tightness after last session  Pt responds well to verbal cueing for proper form  Plan: Continue per plan of care  Progress treatment as tolerated         Precautions: HTN, cardiac hx, dysphonia, dysphagia, Sjogrens      RE: 11/10  EPOC:              Manuals 10/13 10/17 10/20 10/26 10/28                                                            Neuro Re-Ed                                                                                                        Ther Ex             Bike/UBE  UBE 4' retro UBE 4' retro UBE 5' retro UBE 5' retro        scap retraction 5"x20 5"x20 5"x20 5"x20 5"x20        T/S ext 10"x10 W/ ball 5"x20 W/ ball 5" x20  W/ ball   5"x20 W/ ball 5"x20        T/S rot 10"x10 10"x10 10"x10 bilat  NP         Lumbar rollout  10"x10 3 way 10"x10 3 way 10"x10  3 way 10"x10 3 way        Row/LPD  YTB 2x10 ea Rows GTB/  LPD YTB   2x10 ea Rows GTB 2x10  LPD  YTB  2x10 5"  GTB 2x10 ea        Bilateral ER   15x YTB  Supine 15x YTB         H-abd    Supine  2x10 YTB         Open book   10"x10 bilat  Top leg on foam roll 10"x10          Modified thread the needle  5"x10 ea 5"x10 ea   5"x10 ea        Mobility on towel roll    Hugs  20x D/C        Pec stretch    single arm doorway 10"x5 bilat Pt edu MS            HEP MS            Ther Activity                                       Gait Training                                       Modalities

## 2022-10-31 ENCOUNTER — OFFICE VISIT (OUTPATIENT)
Dept: PHYSICAL THERAPY | Facility: CLINIC | Age: 82
End: 2022-10-31

## 2022-10-31 DIAGNOSIS — M54.6 CHRONIC MIDLINE THORACIC BACK PAIN: Primary | ICD-10-CM

## 2022-10-31 DIAGNOSIS — G89.29 CHRONIC MIDLINE THORACIC BACK PAIN: Primary | ICD-10-CM

## 2022-10-31 NOTE — PROGRESS NOTES
Daily Note     Today's date: 10/31/2022  Patient name: Araceli Matias  : 1940  MRN: [id number]  Referring provider: KRISTINA Alvarenga  Dx:   Encounter Diagnosis     ICD-10-CM    1  Chronic midline thoracic back pain  M54 6     G89 29                   Subjective: Pt states she felt alright after last session and has no new complaints  Objective: See treatment diary below      Assessment: Pt with good tolerance to tx  Notes decreased stiffness throughout T/S and L/S by end of session  Good compliance with HEP evident  Progressions not made as pt feeling well and noting decreased pain  Plan: Continue per plan of care  Progress treatment as tolerated         Precautions: HTN, cardiac hx, dysphonia, dysphagia, Sjogrens      RE: 11/10  EPOC:              Manuals 10/13 10/17 10/20 10/26 10/28 10/31                                                           Neuro Re-Ed                                                                                                        Ther Ex             Bike/UBE  UBE 4' retro UBE 4' retro UBE 5' retro UBE 5' retro UBE 6' retro       scap retraction 5"x20 5"x20 5"x20 5"x20 5"x20 5"x20       T/S ext 10"x10 W/ ball 5"x20 W/ ball 5" x20  W/ ball   5"x20 W/ ball 5"x20 5"x20 w/ ball       T/S rot 10"x10 10"x10 10"x10 bilat  NP  10"X10       Lumbar rollout  10"x10 3 way 10"x10 3 way 10"x10  3 way 10"x10 3 way 10"10 x3 way       Row/LPD  YTB 2x10 ea Rows GTB/  LPD YTB   2x10 ea Rows GTB 2x10  LPD  YTB  2x10 5"  GTB 2x10 ea GTB 3x10 ea       Bilateral ER   15x YTB  Supine 15x YTB         H-abd    Supine  2x10 YTB         Open book   10"x10 bilat  Top leg on foam roll 10"x10          Modified thread the needle  5"x10 ea 5"x10 ea   5"x10 ea 5"x10 ea       Mobility on towel roll    Hugs  20x D/C        Pec stretch    single arm doorway 10"x5 bilat                                   Pt edu MS            HEP MS            Ther Activity Gait Training                                       Modalities

## 2022-11-03 ENCOUNTER — OFFICE VISIT (OUTPATIENT)
Dept: PHYSICAL THERAPY | Facility: CLINIC | Age: 82
End: 2022-11-03

## 2022-11-03 DIAGNOSIS — M54.6 CHRONIC MIDLINE THORACIC BACK PAIN: Primary | ICD-10-CM

## 2022-11-03 DIAGNOSIS — G89.29 CHRONIC MIDLINE THORACIC BACK PAIN: Primary | ICD-10-CM

## 2022-11-03 NOTE — PROGRESS NOTES
Daily Note     Today's date: 11/3/2022  Patient name: Mahad Maya  : 1940  MRN: [id number]  Referring provider: KRISTINA Nunes  Dx:   Encounter Diagnosis     ICD-10-CM    1  Chronic midline thoracic back pain  M54 6     G89 29                   Subjective: Pt states she is feeling pretty good, but her sinuses are bothering her  Objective: See treatment diary below      Assessment: Tolerated treatment well  Patient demonstrated fatigue post treatment and would benefit from continued PT  Pt able to exhibit improved upright posture throughout session as well as demonstrate improved spinal mobility  No complaints throughout and responding very well to current program  Notes relief throughout session  Plan: Continue per plan of care  Progress treatment as tolerated         Precautions: HTN, cardiac hx, dysphonia, dysphagia, Sjogrens      RE: 11/10  EPOC:              Manuals 10/13 10/17 10/20 10/26 10/28 10/31 11/3                                                          Neuro Re-Ed                                                                                                        Ther Ex             Bike/UBE  UBE 4' retro UBE 4' retro UBE 5' retro UBE 5' retro UBE 6' retro UBE 6' retro      scap retraction 5"x20 5"x20 5"x20 5"x20 5"x20 5"x20 5"x20      T/S ext 10"x10 W/ ball 5"x20 W/ ball 5" x20  W/ ball   5"x20 W/ ball 5"x20 5"x20 w/ ball 5"x20 w/ ball      T/S rot 10"x10 10"x10 10"x10 bilat  NP  10"X10 10"x10      Lumbar rollout  10"x10 3 way 10"x10 3 way 10"x10  3 way 10"x10 3 way 10"10 x3 way 10"x10 x3 way      Row/LPD  YTB 2x10 ea Rows GTB/  LPD YTB   2x10 ea Rows GTB 2x10  LPD  YTB  2x10 5"  GTB 2x10 ea GTB 3x10 ea GTB 3x10 ea      Bilateral ER   15x YTB  Supine 15x YTB   Sitting YTB 3"x20      H-abd    Supine  2x10 YTB         Open book   10"x10 bilat  Top leg on foam roll 10"x10          Modified thread the needle  5"x10 ea 5"x10 ea   5"x10 ea 5"x10 ea 5"x10 ea      Mobility on [employer] roll    Hugs  20x D/C        Pec stretch    single arm doorway 10"x5 bilat                                   Pt edu MS            HEP MS            Ther Activity                                       Gait Training                                       Modalities

## 2022-11-07 ENCOUNTER — OFFICE VISIT (OUTPATIENT)
Dept: PHYSICAL THERAPY | Facility: CLINIC | Age: 82
End: 2022-11-07

## 2022-11-07 DIAGNOSIS — G89.29 CHRONIC MIDLINE THORACIC BACK PAIN: Primary | ICD-10-CM

## 2022-11-07 DIAGNOSIS — M54.6 CHRONIC MIDLINE THORACIC BACK PAIN: Primary | ICD-10-CM

## 2022-11-07 NOTE — PROGRESS NOTES
Daily Note     Today's date: 2022  Patient name: Reji Villaseñor  : 1940  MRN: [id number]  Referring provider: KRISTINA Red  Dx:   Encounter Diagnosis     ICD-10-CM    1  Chronic midline thoracic back pain  M54 6     G89 29                   Subjective: Pt with no new complaints      Objective: See treatment diary below      Assessment: Tolerated treatment well  Patient demonstrated fatigue post treatment and would benefit from continued PT  Addition of standing vs wall horizontal abduction for postural strengthening  Pt continues to exhibit improved postural maintenance, but occasional cueing still required throughout sessions for proper form and posturing  Plan: Continue per plan of care  Progress treatment as tolerated         Precautions: HTN, cardiac hx, dysphonia, dysphagia, Sjogrens      RE: 11/10  EPOC:              Manuals 10/13 10/17 10/20 10/26 10/28 10/31 11/3 11/7                                                         Neuro Re-Ed                                                                                                        Ther Ex             Bike/UBE  UBE 4' retro UBE 4' retro UBE 5' retro UBE 5' retro UBE 6' retro UBE 6' retro UBE 6' retro     scap retraction 5"x20 5"x20 5"x20 5"x20 5"x20 5"x20 5"x20 5"x20     T/S ext 10"x10 W/ ball 5"x20 W/ ball 5" x20  W/ ball   5"x20 W/ ball 5"x20 5"x20 w/ ball 5"x20 w/ ball 5"x20 w/ ball     T/S rot 10"x10 10"x10 10"x10 bilat  NP  10"X10 10"x10 10"x10     Lumbar rollout  10"x10 3 way 10"x10 3 way 10"x10  3 way 10"x10 3 way 10"10 x3 way 10"x10 x3 way 10"x10 3 way     Row/LPD  YTB 2x10 ea Rows GTB/  LPD YTB   2x10 ea Rows GTB 2x10  LPD  YTB  2x10 5"  GTB 2x10 ea GTB 3x10 ea GTB 3x10 ea BTB (row)/GTB (LPD) 3x10 ea     Bilateral ER   15x YTB  Supine 15x YTB   Sitting YTB 3"x20 sitting YTB 3"x20     H-abd    Supine  2x10 YTB    standing YTB 3"x20     Open book   10"x10 bilat  Top leg on foam roll 10"x10          Modified thread the needle  5"x10 ea 5"x10 ea   5"x10 ea 5"x10 ea 5"x10 ea 5"x10 ea     Mobility on towel roll    Hugs  20x D/C        Pec stretch    single arm doorway 10"x5 bilat                                   Pt edu MS            HEP MS            Ther Activity                                       Gait Training                                       Modalities

## 2022-11-10 ENCOUNTER — OFFICE VISIT (OUTPATIENT)
Dept: PHYSICAL THERAPY | Facility: CLINIC | Age: 82
End: 2022-11-10

## 2022-11-10 DIAGNOSIS — G89.29 CHRONIC MIDLINE THORACIC BACK PAIN: Primary | ICD-10-CM

## 2022-11-10 DIAGNOSIS — M54.6 CHRONIC MIDLINE THORACIC BACK PAIN: Primary | ICD-10-CM

## 2022-11-10 NOTE — PROGRESS NOTES
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 and she is having some increased pain  Objective: See treatment diary below      Assessment: Cont with outlined POC  Tolerated treatment well  Some discomfort during session due to stiffness  Improved mobility following thread the needle and t-spine ext  Discussed importance of performing HEP daily to maintain mobility  Patient demonstrated fatigue post treatment and would benefit from continued PT      Plan: Progress treatment as tolerated         Precautions: HTN, cardiac hx, dysphonia, dysphagia, Sjogrens      RE: 11/10  EPOC:              Manuals 10/13 10/17 10/20 10/26 10/28 10/31 11/3 11/7 11/10                                                        Neuro Re-Ed                                                                                                        Ther Ex             Bike/UBE  UBE 4' retro UBE 4' retro UBE 5' retro UBE 5' retro UBE 6' retro UBE 6' retro UBE 6' retro UBE 6' retro     scap retraction 5"x20 5"x20 5"x20 5"x20 5"x20 5"x20 5"x20 5"x20 5"x20     T/S ext 10"x10 W/ ball 5"x20 W/ ball 5" x20  W/ ball   5"x20 W/ ball 5"x20 5"x20 w/ ball 5"x20 w/ ball 5"x20 w/ ball 5"x20 w/ ball     T/S rot 10"x10 10"x10 10"x10 bilat  NP  10"X10 10"x10 10"x10 10"x10     Lumbar rollout  10"x10 3 way 10"x10 3 way 10"x10  3 way 10"x10 3 way 10"10 x3 way 10"x10 x3 way 10"x10 3 way NV    Row/LPD  YTB 2x10 ea Rows GTB/  LPD YTB   2x10 ea Rows GTB 2x10  LPD  YTB  2x10 5"  GTB 2x10 ea GTB 3x10 ea GTB 3x10 ea BTB (row)/GTB (LPD) 3x10 ea BTB row GTB LPD  3x10 ea    Bilateral ER   15x YTB  Supine 15x YTB   Sitting YTB 3"x20 sitting YTB 3"x20 sitting YTB 2"x20     H-abd Supine  2x10 YTB    standing YTB 3"x20 standing YTB 3"x20     Open book   10"x10 bilat  Top leg on foam roll 10"x10          Modified thread the needle  5"x10 ea 5"x10 ea   5"x10 ea 5"x10 ea 5"x10 ea 5"x10 ea 5" x 10 ea     Mobility on towel roll    Hugs  20x D/C        Pec stretch    single arm doorway 10"x5 bilat                                   Pt edu MS            HEP MS            Ther Activity                                       Gait Training                                       Modalities

## 2022-11-14 ENCOUNTER — TELEPHONE (OUTPATIENT)
Dept: FAMILY MEDICINE CLINIC | Facility: CLINIC | Age: 82
End: 2022-11-14

## 2022-11-14 NOTE — TELEPHONE ENCOUNTER
Patient sees [facility]    I called them at [phone] and requested the order faxed to us to sign off on to continue therapy

## 2022-11-14 NOTE — TELEPHONE ENCOUNTER
Patient sees a difference with PT, she is standing straighter and has no back pain    PT wants her to continue going, she didn't know if you had to update the referral   She thanks you

## 2022-11-15 DIAGNOSIS — J30.2 SEASONAL ALLERGIES: ICD-10-CM

## 2022-11-15 RX ORDER — MONTELUKAST SODIUM 10 MG/1
TABLET ORAL
Qty: 90 TABLET | Refills: 0 | Status: SHIPPED | OUTPATIENT
Start: 2022-11-15

## 2022-11-16 ENCOUNTER — EVALUATION (OUTPATIENT)
Dept: PHYSICAL THERAPY | Facility: CLINIC | Age: 82
End: 2022-11-16

## 2022-11-16 DIAGNOSIS — M54.6 CHRONIC MIDLINE THORACIC BACK PAIN: Primary | ICD-10-CM

## 2022-11-16 DIAGNOSIS — G89.29 CHRONIC MIDLINE THORACIC BACK PAIN: Primary | ICD-10-CM
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
## 2022-11-22 ENCOUNTER — OFFICE VISIT (OUTPATIENT)
Dept: PHYSICAL THERAPY | Facility: CLINIC | Age: 82
End: 2022-11-22

## 2022-11-22 DIAGNOSIS — G89.29 CHRONIC MIDLINE THORACIC BACK PAIN: Primary | ICD-10-CM

## 2022-11-22 DIAGNOSIS — M54.6 CHRONIC MIDLINE THORACIC BACK PAIN: Primary | ICD-10-CM

## 2022-11-22 NOTE — PROGRESS NOTES
Daily Note     Today's date: 2022  Patient name: Emil Hernández  : 1940  MRN: [id number]  Referring provider: KRISTINA Kothari  Dx:   Encounter Diagnosis     ICD-10-CM    1  Chronic midline thoracic back pain  M54 6     G89 29                      Subjective: Pt states she has been doing her HEP and is feeling pretty good overall      Objective: See treatment diary below      Assessment: Tolerated treatment well  Patient demonstrated fatigue post treatment and would benefit from continued PT  Able to progress postural strengthening without significant issue other than muscle fatigue  Improved postural awareness and correction exhibited throughout, without cueing required  Program not progressed this date as pt having very positive response to tx  Plan: Continue per plan of care  Progress treatment as tolerated         Precautions: HTN, cardiac hx, dysphonia, dysphagia, Sjogrens      RE:   EPOC:              Manuals 11/22    10/28 10/31 11/3 11/7 11/10 11/16                                                       Neuro Re-Ed                                                                                                        Ther Ex             Bike/UBE UBE 6' retro    UBE 5' retro UBE 6' retro UBE 6' retro UBE 6' retro UBE 6' retro     scap retraction 5"x20    5"x20 5"x20 5"x20 5"x20 5"x20     T/S ext hep    W/ ball 5"x20 5"x20 w/ ball 5"x20 w/ ball 5"x20 w/ ball 5"x20 w/ ball     T/S rot hep     10"X10 10"x10 10"x10 10"x10     Lumbar rollout 10"x10 3 way    10"x10 3 way 10"10 x3 way 10"x10 x3 way 10"x10 3 way NV 10"x10 3 way   Row/LPD BTB row    GTB LPD 3x10 ea    GTB 2x10 ea GTB 3x10 ea GTB 3x10 ea BTB (row)/GTB (LPD) 3x10 ea BTB row GTB LPD  3x10 ea BTB row, GTG LPD 3x10 ea   Bilateral ER RTB 3"x20      Sitting YTB 3"x20 sitting YTB 3"x20 sitting YTB 2"x20  YTB 3"x20   H-abd RTB 3"x20       standing YTB 3"x20 standing YTB 3"x20  YTB 3"x20   Open book             Modified thread the needle 10"x10 ea    5"x10 ea 5"x10 ea 5"x10 ea 5"x10 ea 5" x 10 ea  5"x10 ea   Mobility on towel roll     D/C        Pec stretch                                       Pt edu          Subjective, Objective, Foto-   Data collection   HEP             Ther Activity                                       Gait Training                                       Modalities

## 2022-11-29 ENCOUNTER — OFFICE VISIT (OUTPATIENT)
Dept: PHYSICAL THERAPY | Facility: CLINIC | Age: 82
End: 2022-11-29

## 2022-11-29 DIAGNOSIS — G89.29 CHRONIC MIDLINE THORACIC BACK PAIN: Primary | ICD-10-CM

## 2022-11-29 DIAGNOSIS — M54.6 CHRONIC MIDLINE THORACIC BACK PAIN: Primary | ICD-10-CM

## 2022-11-29 NOTE — PROGRESS NOTES
Daily Note     Today's date: 2022  Patient name: Fanny Willingham  : 1940  MRN: [id number]  Referring provider: KRISTINA Rodrigues  Dx:   Encounter Diagnosis     ICD-10-CM    1  Chronic midline thoracic back pain  M54 6     G89 29                      Subjective: Patient reports therapy is helping a lot  She feels she may want to come 2x a week again later in Dec after she is done with some family things  Objective: See treatment diary below      Assessment: Tolerated treatment well  Cont with outlined program  Able to perform higher rep sets with rows/ext showing improvement of muscular endurance  Patient is able to self correct posture throughout session, exhibiting better postural awareness  Patient demonstrated fatigue post treatment and would benefit from continued PT      Plan: Progress treatment as tolerated         Precautions: HTN, cardiac hx, dysphonia, dysphagia, Sjogrens      RE:   EPOC:              Manuals 11/22 11/29   10/28 10/31 11/3 11/7 11/10 11/16                                                       Neuro Re-Ed                                                                                                        Ther Ex             Bike/UBE UBE 6' retro UBE 6' retro    UBE 5' retro UBE 6' retro UBE 6' retro UBE 6' retro UBE 6' retro     scap retraction 5"x20 5"x30   5"x20 5"x20 5"x20 5"x20 5"x20     T/S ext hep    W/ ball 5"x20 5"x20 w/ ball 5"x20 w/ ball 5"x20 w/ ball 5"x20 w/ ball     T/S rot hep     10"X10 10"x10 10"x10 10"x10     Lumbar rollout 10"x10 3 way 10"x10 3 way   10"x10 3 way 10"10 x3 way 10"x10 x3 way 10"x10 3 way NV 10"x10 3 way   Row/LPD BTB row    GTB LPD 3x10 ea BTB row  GTB LPD  2x15 ea   GTB 2x10 ea GTB 3x10 ea GTB 3x10 ea BTB (row)/GTB (LPD) 3x10 ea BTB row GTB LPD  3x10 ea BTB row, GTG LPD 3x10 ea   Bilateral ER RTB 3"x20 RTB 3" x20     Sitting YTB 3"x20 sitting YTB 3"x20 sitting YTB 2"x20  YTB 3"x20   H-abd RTB 3"x20 RTB 3" x20      standing YTB 3"x20 standing YTB 3"x20  YTB 3"x20   Open book             Modified thread the needle 10"x10 ea 10"x10 ea   5"x10 ea 5"x10 ea 5"x10 ea 5"x10 ea 5" x 10 ea  5"x10 ea   Mobility on towel roll     D/C        Pec stretch                                       Pt edu          Subjective, Objective, Foto-   Data collection   HEP             Ther Activity                                       Gait Training                                       Modalities

## 2022-12-06 ENCOUNTER — OFFICE VISIT (OUTPATIENT)
Dept: PHYSICAL THERAPY | Facility: CLINIC | Age: 82
End: 2022-12-06

## 2022-12-06 DIAGNOSIS — M54.6 CHRONIC MIDLINE THORACIC BACK PAIN: Primary | ICD-10-CM

## 2022-12-06 DIAGNOSIS — G89.29 CHRONIC MIDLINE THORACIC BACK PAIN: Primary | ICD-10-CM

## 2022-12-06 NOTE — PROGRESS NOTES
Daily Note     Today's date: 2022  Patient name: Jason Caicedo  : 1940  MRN: [id number]  Referring provider: KRISTINA Coffey  Dx:   Encounter Diagnosis     ICD-10-CM    1  Chronic midline thoracic back pain  M54 6     G89 29                      Subjective: Pt states she felt so good after last session so she ended up "overdoing it" at home and as a result her knee is bothering her  Objective: See treatment diary below      Assessment: Tolerated treatment well  Patient would benefit from continued PT  Pt exhibits improved self-correction of posture, as well as improved postural maintenance throughout session  Decreased compensatory motions (leaning) observed with resisted row  Re-Eval planned for next session  Plan: Continue per plan of care  Progress treatment as tolerated         Precautions: HTN, cardiac hx, dysphonia, dysphagia, Sjogrens      RE:   EPOC:              Manuals 11/22 11/29 12/5  10/28 10/31 11/3 11/7 11/10 11/16                                                       Neuro Re-Ed                                                                                                        Ther Ex             Bike/UBE UBE 6' retro UBE 6' retro  6' retro UBE  UBE 5' retro UBE 6' retro UBE 6' retro UBE 6' retro UBE 6' retro     scap retraction 5"x20 5"x30 5"x30  5"x20 5"x20 5"x20 5"x20 5"x20     T/S ext hep    W/ ball 5"x20 5"x20 w/ ball 5"x20 w/ ball 5"x20 w/ ball 5"x20 w/ ball     T/S rot hep     10"X10 10"x10 10"x10 10"x10     Lumbar rollout 10"x10 3 way 10"x10 3 way 3 way 10"x10  10"x10 3 way 10"10 x3 way 10"x10 x3 way 10"x10 3 way NV 10"x10 3 way   Row/LPD BTB row    GTB LPD 3x10 ea BTB row  GTB LPD  2x15 ea BTB 2x15 row  GTB 2x10 ea GTB 3x10 ea GTB 3x10 ea BTB (row)/GTB (LPD) 3x10 ea BTB row GTB LPD  3x10 ea BTB row, GTG LPD 3x10 ea   Bilateral ER RTB 3"x20 RTB 3" x20 GTB 3"x20    Sitting YTB 3"x20 sitting YTB 3"x20 sitting YTB 2"x20  YTB 3"x20   H-abd RTB 3"x20 RTB 3" x20 GTB 3"x20     standing YTB 3"x20 standing YTB 3"x20  YTB 3"x20   Open book             Modified thread the needle 10"x10 ea 10"x10 ea   5"x10 ea 5"x10 ea 5"x10 ea 5"x10 ea 5" x 10 ea  5"x10 ea   Mobility on towel roll     D/C        Pec stretch                                       Pt edu          Subjective, Objective, Foto-   Data collection   HEP             Ther Activity                                       Gait Training                                       Modalities

## 2022-12-09 DIAGNOSIS — D73.5 SPLENIC INFARCTION: ICD-10-CM

## 2022-12-12 RX ORDER — CLOPIDOGREL BISULFATE 75 MG/1
TABLET ORAL
Qty: 90 TABLET | Refills: 1 | Status: SHIPPED | OUTPATIENT
Start: 2022-12-12

## 2022-12-13 ENCOUNTER — EVALUATION (OUTPATIENT)
Dept: PHYSICAL THERAPY | Facility: CLINIC | Age: 82
End: 2022-12-13

## 2022-12-13 DIAGNOSIS — M54.6 CHRONIC MIDLINE THORACIC BACK PAIN: Primary | ICD-10-CM

## 2022-12-13 DIAGNOSIS — G89.29 CHRONIC MIDLINE THORACIC BACK PAIN: Primary | ICD-10-CM

## 2022-12-13 NOTE — PROGRESS NOTES
PT Re-Evaluation  and PT Discharge         Today's date: 2022  Patient name: Cata Mckeon  : 1940  MRN: [id number]  Referring provider: KRISTINA [place]  Dx:   Encounter Diagnosis     ICD-10-CM    1  Chronic midline thoracic back pain  M54 6     G89 29                      Assessment  Assessment details:  Re-Eval/DISCHARGE: Pt reports 100% improvement over her course of care  She notes there are many days where she has no pain, and that when she does have pain it is much less intensity and resolves after she does her HEP  Gross thoracolumbar ROM WFL/age-appropriate  Improved FOTO to 61 also indicative of functional improvements  She is appropriate and agreeable to DC this date secondary to progress made, goal achievement, and restoration of PLOF           Re-Eval: Pt reports 90% improvement over her course of care  She reports decreased overall pain intensity and frequency, improved postural maintenance  Great compliance with HEP also evident by improvements in mobility, posture, pain levels  She would benefit from continued PT to continue progressing postural endurance in order to restore PLOF, with plan to decrease frequency to 1x/week to transition towards discharge  Impairments: abnormal or restricted ROM, activity intolerance, impaired physical strength, lacks appropriate home exercise program, pain with function, poor posture  and poor body mechanics    Symptom irritability: moderateBarriers to therapy: Comorbidities, chronicity of dx  Understanding of Dx/Px/POC: good   Prognosis: good    Goals  STG-in 4 weeks  1  Pain at worst 5/10-met  2  Improved thoracolumbar ROM by 10% grossly-met  3  Pt able to maintain upright posture x5 min without extremal cueing-met    LTG-in 8 weeks  1  Pain at worst 3/10-85% met  2  Independent with HEP-met  3  Increase FOTO to >/= expected by DC-met  4  Pt able to return to normal recreational activities without back pain-met  5   Pt able to sit x60 mins without back pain-met    Plan  Plan details: DC 22  Patient would benefit from: skilled physical therapy  Plan of Care beginning date: 2022  Plan of Care expiration date: 2022  Treatment plan discussed with: patient        Subjective Evaluation    History of Present Illness  Mechanism of injury:  Re-Eval: Pt notes she has been feeling good since her last assessment  Continues to have great compliance with HEP  Notes she has been doing a lot around her house and denies any issues related to her back  Reports good self-management of symptoms if she does something aggravates her back       Re-Eval: Pt feels PT is really helping  She notes decreased pain intensity and frequency, and feels she is standing up straighter  She also notes good compliance with HEP and feels they really help manage her sx          Quality of life: good    Pain  Current pain ratin  At best pain ratin  At worst pain ratin  Location: thoracic spine  Quality: dull ache  Aggravating factors: sitting, standing and walking  Progression: no change    Social Support  Stairs in house: yes   Lives in: multiple-level home  Lives with: spouse      Diagnostic Tests  X-ray: abnormal  Treatments  Previous treatment: chiropractic  Current treatment: physical therapy  Patient Goals  Patient goals for therapy: decreased pain, increased motion, increased strength and return to sport/leisure activities  Patient goal: pt would like to be able to more mobile and have more stamina        Objective     Postural Observations  Seated posture: fair  Standing posture: fair  Correction of posture: makes symptoms better        Active Range of Motion     Lumbar   Flexion: 90 degrees  Restriction level: minimal  Extension: 20 degrees  Restriction level: minimal  Left lateral flexion: 40 degrees       Right lateral flexion: 40 degrees     Strength/Myotome Testing     Lumbar   Left   Normal strength    Right   Normal strength    General Comments:      Lumbar Comments  Decreased postural strength/endurance-improved  Symptoms improve with postural correction, T/S extension                  Precautions: HTN, cardiac hx, dysphonia, dysphagia, Sjogrens      RE: 12/8  EPOC: 12/8             Manuals 11/22 11/29 12/5 12/13 RE/DC 10/28 10/31 11/3 11/7 11/10 11/16                                                       Neuro Re-Ed                                                                                                        Ther Ex             Bike/UBE UBE 6' retro UBE 6' retro  6' retro UBE  UBE 5' retro UBE 6' retro UBE 6' retro UBE 6' retro UBE 6' retro     scap retraction 5"x20 5"x30 5"x30  5"x20 5"x20 5"x20 5"x20 5"x20     T/S ext hep    W/ ball 5"x20 5"x20 w/ ball 5"x20 w/ ball 5"x20 w/ ball 5"x20 w/ ball     T/S rot hep     10"X10 10"x10 10"x10 10"x10     Lumbar rollout 10"x10 3 way 10"x10 3 way 3 way 10"x10  10"x10 3 way 10"10 x3 way 10"x10 x3 way 10"x10 3 way NV 10"x10 3 way   Row/LPD BTB row    GTB LPD 3x10 ea BTB row  GTB LPD  2x15 ea BTB 2x15 row  GTB 2x10 ea GTB 3x10 ea GTB 3x10 ea BTB (row)/GTB (LPD) 3x10 ea BTB row GTB LPD  3x10 ea BTB row, GTG LPD 3x10 ea   Bilateral ER RTB 3"x20 RTB 3" x20 GTB 3"x20    Sitting YTB 3"x20 sitting YTB 3"x20 sitting YTB 2"x20  YTB 3"x20   H-abd RTB 3"x20 RTB 3" x20 GTB 3"x20     standing YTB 3"x20 standing YTB 3"x20  YTB 3"x20   Open book             Modified thread the needle 10"x10 ea 10"x10 ea   5"x10 ea 5"x10 ea 5"x10 ea 5"x10 ea 5" x 10 ea  5"x10 ea   Mobility on towel roll     D/C        Pec stretch                                       Pt edu    MS-RE/DC, FOTO, subj/obj      Subjective, Objective, Foto-   Data collection   HEP             Ther Activity                                       Gait Training                                       Modalities

## 2022-12-20 ENCOUNTER — APPOINTMENT (OUTPATIENT)
Dept: PHYSICAL THERAPY | Facility: CLINIC | Age: 82
End: 2022-12-20

## 2022-12-23 DIAGNOSIS — E78.2 MIXED HYPERLIPIDEMIA: ICD-10-CM

## 2022-12-23 DIAGNOSIS — I10 ESSENTIAL HYPERTENSION: ICD-10-CM

## 2022-12-23 RX ORDER — ATORVASTATIN CALCIUM 40 MG/1
TABLET, FILM COATED ORAL
Qty: 180 TABLET | Refills: 1 | Status: SHIPPED | OUTPATIENT
Start: 2022-12-23

## 2022-12-23 RX ORDER — LABETALOL 100 MG/1
200 TABLET, FILM COATED ORAL EVERY 8 HOURS
Qty: 540 TABLET | Refills: 0 | Status: SHIPPED | OUTPATIENT
Start: 2022-12-23 | End: 2023-03-23

## 2022-12-27 ENCOUNTER — APPOINTMENT (OUTPATIENT)
Dept: PHYSICAL THERAPY | Facility: CLINIC | Age: 82
End: 2022-12-27

## 2023-01-20 ENCOUNTER — RA CDI HCC (OUTPATIENT)
Dept: OTHER | Facility: HOSPITAL | Age: 83
End: 2023-01-20

## 2023-01-20 ENCOUNTER — TELEMEDICINE (OUTPATIENT)
Dept: FAMILY MEDICINE CLINIC | Facility: CLINIC | Age: 83
End: 2023-01-20

## 2023-01-20 DIAGNOSIS — U07.1 POSITIVE SELF-ADMINISTERED ANTIGEN TEST FOR COVID-19: ICD-10-CM

## 2023-01-20 DIAGNOSIS — R51.9 SINUS HEADACHE: Primary | ICD-10-CM

## 2023-01-20 DIAGNOSIS — R05.1 ACUTE COUGH: ICD-10-CM

## 2023-01-20 DIAGNOSIS — R53.1 WEAKNESS: ICD-10-CM

## 2023-01-20 RX ORDER — BUDESONIDE AND FORMOTEROL FUMARATE DIHYDRATE 160; 4.5 UG/1; UG/1
2 AEROSOL RESPIRATORY (INHALATION) 2 TIMES DAILY
Qty: 10.2 G | Refills: 0 | Status: SHIPPED | OUTPATIENT
Start: 2023-01-20 | End: 2023-01-24

## 2023-01-20 RX ORDER — BENZONATATE 200 MG/1
200 CAPSULE ORAL
Qty: 10 CAPSULE | Refills: 0 | Status: SHIPPED | OUTPATIENT
Start: 2023-01-20 | End: 2023-01-30

## 2023-01-20 RX ORDER — MOLNUPIRAVIR 200 MG/1
800 CAPSULE ORAL EVERY 12 HOURS
Qty: 40 CAPSULE | Refills: 0 | Status: SHIPPED | OUTPATIENT
Start: 2023-01-20 | End: 2023-01-25

## 2023-01-20 RX ORDER — COVID-19 ANTIGEN TEST
KIT MISCELLANEOUS
COMMUNITY
Start: 2023-01-10

## 2023-01-20 NOTE — PROGRESS NOTES
[id number]  [facility] 75  coding opportunities          Chart Reviewed number of suggestions sent to Provider: 1     Patients Insurance     Medicare Insurance: [employer]

## 2023-01-20 NOTE — PROGRESS NOTES
COVID-19 Outpatient Progress Note    Assessment/Plan:    Patient is an 82-year-old female presents in office for virtual visit  Complaints of fevers chills sinus congestion sinus pressure headaches cough and weakness since yesterday  Was exposed to her  who was positive for COVID she tested home test positive this morning for COVID presents in office via virtual visit to discuss treatment options  Discussed supportive measures symptom management I have ordered molnupiravir  As instructed  Add vitamins C and D  just for few days hydrate well  Fever management and follow up in office in 2 weeks   If any chest pain or shortness of breath instructed to go to the emergency room she is to hold the cholesterol medicine for now until done with the antivirals  Problem List Items Addressed This Visit    None  Visit Diagnoses     Sinus headache    -  Primary    Relevant Medications    molnupiravir (Lagevrio) 200 mg capsule    budesonide-formoterol (Symbicort) 160-4 5 mcg/act inhaler    Acute cough        Relevant Medications    molnupiravir (Lagevrio) 200 mg capsule    budesonide-formoterol (Symbicort) 160-4 5 mcg/act inhaler    benzonatate (TESSALON) 200 MG capsule    Weakness        Relevant Medications    molnupiravir (Lagevrio) 200 mg capsule    budesonide-formoterol (Symbicort) 160-4 5 mcg/act inhaler    Positive self-administered antigen test for COVID-19        Relevant Medications    molnupiravir (Lagevrio) 200 mg capsule    budesonide-formoterol (Symbicort) 160-4 5 mcg/act inhaler         Disposition:     Discussed symptom directed medication options with patient  Discussed vitamin D, vitamin C, and/or zinc supplementation with patient  Discussed risks, benefits, and side effects of inhaled corticosteroids and they agree to treatment  Patient meets criteria for Molnupiravir and they have been counseled according to EUA documentation provided by the FDA   After discussion, patient agrees to treatment  Ulysess Flock is an investigational medicine used to treat mild-to-moderate COVID-19 in adults with positive results of direct SARS-CoV-2 viral testing, and who are at high risk for progressing to severe COVID-19 including hospitalization or death, and for whom other COVID-19 treatment options authorized by the FDA are not accessible or clinically appropriate  The FDA has authorized the emergency use of molnupiravir for the treatment of mild-tomoderate COVID-19 in adults under an EUA  Ulysess Flock is not authorized:  - for use in people less than 25years of age   - for prevention of COVID-19   - for people needing hospitalization for COVID-19   - for use for longer than 5 consecutive days    What is the most important information I should know about molnupiravir? Ulysess Flock may cause serious side effects, including:    Ulysess Flock may cause harm to your unborn baby  It is not known if molnupiravir will harm your baby if you take molnupiravir during pregnancy  - Ulysess Flock is not recommended for use in pregnancy  - Ulysess Flock has not been studied in pregnancy  Ulysess Flock was studied in pregnant animals only  When molnupiravir was given to pregnant animals, molnupiravir caused harm to their unborn babies   - You and your healthcare provider may decide that you should take molnupiravir duringpregnancy if there are no other COVID-19 treatment options authorized by the FDA that are accessible or clinically appropriate for you  - If you and your healthcare provider decide that you should take molnupiravir during pregnancy, you and your healthcare provider should discuss the known and potential benefits and the potential risks of taking molnupiravir during pregnancy      For individuals who are able to become pregnant:  - You should use a reliable method of birth control (contraception) consistently and correctly during treatment with molnupiravir and for 4 days after the last dose of molnupiravir  Talk to your healthcare provider about reliable birth control methods  - Before starting treatment with molnupiravir your healthcare provider may do a pregnancy test to see if you are pregnant before starting treatment with molnupiravir  - Tell your healthcare provider right away if you become pregnant or think you may be pregnant during treatment with molnupiravir  Pregnancy Surveillance Program:  - There is a pregnancy surveillance program for individuals who take molnupiravir during pregnancy  The purpose of this program is to collect information about the health of you and your baby  Talk to your healthcare provider about how to take part in this program   - If you take molnupiravir during pregnancy and you agree to participate in the pregnancy surveillance program and allow your healthcare provider to share your information with Denice Badillo, then your healthcare provider will report your use of molnupiravir during pregnancy to [address] [place] [address],[address]  by calling [phone] or pregnancyreporting msd com  For individuals who are sexually active with partners who are able to become pregnant:  - It is not known if molnupiravir can affect sperm  While the risk is regarded as low, animal studies to fully assess the potential for molnupiravir to affect the babies of males treated with molnupiravir have not been completed  A reliable method of birth control (contraception) should be used consistently and correctly during treatment with molnupiravir and for at least 3 months after the last dose  The risk to sperm beyond 3 months is not known  Studies to understand the risk to sperm beyond 3 months are ongoing  Talk to your healthcare provider about reliable birth control methods  Talk to your healthcare provider if you have questions or concerns about how molnupiravir may affect sperm  How do I take molnupiravir?     - Take molnupiravir exactly as your healthcare provider tells you to take it  - Take 4 capsules of molnupiravir every 12 hours (for example, at 8 am and at 8 pm)  - Take molnupiravir for 5 days  It is important that you complete the full 5 days of treatment with molnupiravir  Do not stop taking molnupiravir before you complete the full 5 days of treatment, even if you feel better  - Take molnupiravir with or without food  - You should stay in isolation for as long as your healthcare provider tells you to  Talk to your healthcare provider if you are not sure about how to properly isolate while you have COVID-19   - Swallow molnupiravir capsules whole  Do not open, break, or crush the capsules  If you cannot swallow capsules whole, tell your healthcare provider  What to do if you miss a dose:  - If it has been less than 10 hours since the missed dose, take it as soon as you remember  - If it has been more than 10 hours since the missed dose, skip the missed dose and take your dose at the next scheduled time  - Do not double the dose of molnupiravir to make up for a missed dose  What are the important possible side effects of molnupiravir? Possible side effects of molnupiravir are:  - See, Primus Luke is the most important information I should know about molnupiravir?”  - Diarrhea  - Nausea  - Dizziness    These are not all the possible side effects of molnupiravir  Not many people have taken molnupiravir  Serious and unexpected side effects may happen  This medicine is still being studied, so it is possible that all of the risks are not known at this time  What other treatment choices are there? Like Kala Chavez may allow for the emergency use of other medicines to treat people with COVID-19  Go to [facility] for more information  It is your choice to be treated or not to be treated with molnupiravir   Should you decide not to take it, it will not change your standard medical care  What if I am breastfeeding? Breastfeeding is not recommended during treatment with molnupiravir and for 4 days after the last dose of molnupiravir  If you are breastfeeding or plan to breastfeed, talk to your healthcare provider about your options and specific situation before taking molnupiravir  How do I report side effects with molnupiravir? Contact your healthcare provider if you have any side effects that bother you or do not go away  Report side effects to FDA MedWatch at www fda gov/medwatch or call 1-800-MXG-3787 (0-882-552-8228)  How should I store [address]? - Store molnupiravir capsules at room temperature between 68°F to 77°F (20°C to 25°C)  - Keep molnupiravir and all medicines out of the reach of children and pets  Full fact sheet for patients, parents, and caregivers can be found at: [employer]    I have spent 25 minutes directly with the patient  Greater than 50% of this time was spent in counseling/coordination of care regarding: diagnostic results, prognosis, risks and benefits of treatment options, instructions for management, patient and family education, importance of treatment compliance and risk factor reductions  Positive COVID test at home       Encounter provider: Libby Cortez     Provider located at: [address] [place] [address]  [phone]     Recent Visits  No visits were found meeting these conditions  Showing recent visits within past 7 days and meeting all other requirements  Today's Visits  Date Type Provider Dept   01/20/23 Telemedicine Melanie Acosta, [address] [place] today's visits and meeting all other requirements  Future Appointments  No visits were found meeting these conditions    Showing future appointments within next 150 days and meeting all other requirements     This virtual check-in was done via telephone and she agrees to proceed  Patient agrees to participate in a virtual check in via telephone or video visit instead of presenting to the office to address urgent/immediate medical needs  Patient is aware this is a billable service  She acknowledged consent and understanding of privacy and security of the video platform  The patient has agreed to participate and understands they can discontinue the visit at any time  After connecting through Telephone, the patient was identified by name and date of birth  Alan Mcdowell was informed that this was a telemedicine visit and that the exam was being conducted confidentially over secure lines  My office door was closed  No one else was in the room  Alan Mcdowell acknowledged consent and understanding of privacy and security of the telemedicine visit  I informed the patient that I have reviewed her record in Epic and presented the opportunity for her to ask any questions regarding the visit today  The patient agreed to participate  Verification of patient location:  Patient is located in the following state in which I hold an active license: [place]    Subjective:   Alan Mcdowell is a [de-identified] y o  female who is concerned about COVID-19  Patient's symptoms include chills, fatigue, malaise, nasal congestion, rhinorrhea, sore throat, cough, myalgias and headache  Patient denies abdominal pain       - Date of symptom onset: 1/18/2023  - Date of exposure: 1/16/2023      COVID-19 vaccination status: Fully vaccinated with booster    Exposure:   Contact with a person who is under investigation (PUI) for or who is positive for COVID-19 within the last 14 days?: Yes    Hospitalized recently for fever and/or lower respiratory symptoms?: No      Currently a healthcare worker that is involved in direct patient care?: No      Works in a special setting where the risk of COVID-19 transmission may be high? (this may include long-term care, correctional and longterm facilities; homeless shelters; assisted-living facilities and group homes ): No      Resident in a special setting where the risk of COVID-19 transmission may be high? (this may include long-term care, correctional and [facility] facilities; homeless shelters; assisted-living facilities and group homes ): No      No results found for: SARSCOV2, [address] [facility], [address] [facility], [facility], [facility], [address] [facility]    Review of Systems   Constitutional: Positive for chills and fatigue  HENT: Positive for congestion, rhinorrhea and sore throat  Difficulty swallowing    Respiratory: Positive for cough  Underlying HTN   Hyperlipemia    Gastrointestinal: Negative for abdominal distention and abdominal pain  Endocrine: Negative  Genitourinary: Negative for difficulty urinating and flank pain  Musculoskeletal: Positive for myalgias  Skin: Negative for rash  Neurological: Positive for weakness and headaches  Psychiatric/Behavioral: Positive for sleep disturbance  Negative for suicidal ideas  The patient is nervous/anxious  Current Outpatient Medications on File Prior to Visit   Medication Sig   • ascorbic acid (VITAMIN C) 1000 MG tablet Take 1,000 mg by mouth   • Aspirin Low Dose 81 MG EC tablet Take 1 tablet (81 mg total) by mouth daily   • atorvastatin (LIPITOR) 40 mg tablet TAKE 2 TABLETS BY MOUTH EVERY DAY   • azelastine (ASTELIN) 0 1 % nasal spray 1 spray into each nostril 2 (two) times a day   • B Complex Vitamins (B COMPLEX 1 PO) Take 2 tablets by mouth daily Each tablet is 40mg- Patient is taking liquid form     • clobetasol (TEMOVATE) 0 05 % external solution Apply topically 2 (two) times a day To scalp for psoriasis   • clopidogrel (PLAVIX) 75 mg tablet TAKE 1 TABLET BY MOUTH EVERY DAY   • [facility] Covid-19 At Home Test Kit KIT REFER TO  INSTRUCTIONS PACKAGING   • cycloSPORINE (RESTASIS) 0 05 % ophthalmic emulsion Apply 1 drop to eye 2 (two) times a day   • fluticasone (FLONASE) 50 mcg/act nasal spray SPRAY 1 SPRAY INTO EACH NOSTRIL EVERY DAY   • labetalol (NORMODYNE) 100 mg tablet TAKE 2 TABLETS (200 MG TOTAL) BY MOUTH EVERY 8 (EIGHT) HOURS   • losartan (COZAAR) 100 MG tablet TAKE 1 TABLET BY MOUTH EVERY DAY   • montelukast (SINGULAIR) 10 mg tablet TAKE 1 TABLET BY MOUTH EVERYDAY AT BEDTIME   • multivitamin (THERAGRAN) TABS Take 1 tablet by mouth daily   • olopatadine HCl (PATADAY) 0 2 % opth drops Apply 1 drop to eye 2 (two) times a day   • Omega-3 Fatty Acids (fish oil) 1,000 mg Take 2 g by mouth 2 (two) times a day   • White Petrolatum-Mineral Oil (artificial tears) Administer 1 application to both eyes as needed for dry eyes 1 drop in each eye       Objective: There were no vitals taken for this visit  Physical Exam  Vitals and nursing note reviewed  Constitutional:       Appearance: Normal appearance  HENT:      Head: Atraumatic  Pulmonary:      Effort: Pulmonary effort is normal       Comments: Coughing   Neurological:      Mental Status: She is alert and oriented to person, place, and time     Psychiatric:         Mood and Affect: Mood normal          Behavior: Behavior normal        KRISTINA Lira

## 2023-01-24 DIAGNOSIS — I10 ESSENTIAL HYPERTENSION: ICD-10-CM

## 2023-01-24 DIAGNOSIS — L40.9 PSORIASIS OF SCALP: ICD-10-CM

## 2023-01-24 RX ORDER — FLUTICASONE PROPIONATE AND SALMETEROL XINAFOATE 230; 21 UG/1; UG/1
2 AEROSOL, METERED RESPIRATORY (INHALATION) 2 TIMES DAILY
Qty: 12 G | Refills: 0 | Status: SHIPPED | OUTPATIENT
Start: 2023-01-24

## 2023-01-24 RX ORDER — CLOBETASOL PROPIONATE 0.46 MG/ML
SOLUTION TOPICAL 2 TIMES DAILY
Qty: 150 ML | Refills: 1 | Status: SHIPPED | OUTPATIENT
Start: 2023-01-24

## 2023-01-24 RX ORDER — LOSARTAN POTASSIUM 100 MG/1
TABLET ORAL
Qty: 90 TABLET | Refills: 4 | Status: SHIPPED | OUTPATIENT
Start: 2023-01-24

## 2023-01-25 ENCOUNTER — CLINICAL SUPPORT (OUTPATIENT)
Dept: URGENT CARE | Facility: CLINIC | Age: 83
End: 2023-01-25

## 2023-01-25 ENCOUNTER — APPOINTMENT (OUTPATIENT)
Dept: RADIOLOGY | Facility: CLINIC | Age: 83
End: 2023-01-25

## 2023-01-25 ENCOUNTER — TELEMEDICINE (OUTPATIENT)
Dept: FAMILY MEDICINE CLINIC | Facility: CLINIC | Age: 83
End: 2023-01-25

## 2023-01-25 ENCOUNTER — APPOINTMENT (OUTPATIENT)
Dept: LAB | Facility: CLINIC | Age: 83
End: 2023-01-25

## 2023-01-25 DIAGNOSIS — T78.40XA ALLERGIC REACTION, INITIAL ENCOUNTER: Primary | ICD-10-CM

## 2023-01-25 DIAGNOSIS — I10 ESSENTIAL HYPERTENSION: ICD-10-CM

## 2023-01-25 DIAGNOSIS — U09.9 POST-COVID SYNDROME: ICD-10-CM

## 2023-01-25 DIAGNOSIS — R21 RASH: ICD-10-CM

## 2023-01-25 DIAGNOSIS — E78.2 MIXED HYPERLIPIDEMIA: ICD-10-CM

## 2023-01-25 DIAGNOSIS — I74.10 SHAGGY AORTA SYNDROME (HCC): ICD-10-CM

## 2023-01-25 DIAGNOSIS — R73.01 ELEVATED FASTING GLUCOSE: ICD-10-CM

## 2023-01-25 DIAGNOSIS — J38.00 VOCAL CORD PARESIS: ICD-10-CM

## 2023-01-25 DIAGNOSIS — E87.8 ELECTROLYTE ABNORMALITY: ICD-10-CM

## 2023-01-25 DIAGNOSIS — Z79.01 CURRENT USE OF LONG TERM ANTICOAGULATION: ICD-10-CM

## 2023-01-25 DIAGNOSIS — T78.40XA ALLERGIC REACTION, INITIAL ENCOUNTER: ICD-10-CM

## 2023-01-25 DIAGNOSIS — L40.9 PSORIASIS OF SCALP: ICD-10-CM

## 2023-01-25 DIAGNOSIS — Z00.00 MEDICARE ANNUAL WELLNESS VISIT, SUBSEQUENT: ICD-10-CM

## 2023-01-25 DIAGNOSIS — R05.1 ACUTE COUGH: ICD-10-CM

## 2023-01-25 DIAGNOSIS — E04.9 ENLARGED THYROID: ICD-10-CM

## 2023-01-25 DIAGNOSIS — I25.10 CORONARY ARTERY DISEASE DUE TO LIPID RICH PLAQUE: ICD-10-CM

## 2023-01-25 DIAGNOSIS — I83.813 VARICOSE VEINS OF BILATERAL LOWER EXTREMITIES WITH PAIN: ICD-10-CM

## 2023-01-25 DIAGNOSIS — M35.09 SJOGREN'S SYNDROME WITH OTHER ORGAN INVOLVEMENT (HCC): ICD-10-CM

## 2023-01-25 DIAGNOSIS — E55.9 VITAMIN D DEFICIENCY: ICD-10-CM

## 2023-01-25 DIAGNOSIS — I25.83 CORONARY ARTERY DISEASE DUE TO LIPID RICH PLAQUE: ICD-10-CM

## 2023-01-25 DIAGNOSIS — Z98.890 HISTORY OF CAROTID ENDARTERECTOMY: ICD-10-CM

## 2023-01-25 DIAGNOSIS — E87.1 HYPONATREMIA: ICD-10-CM

## 2023-01-25 LAB
25(OH)D3 SERPL-MCNC: 34.2 NG/ML (ref 30–100)
ALBUMIN SERPL BCP-MCNC: 3.5 G/DL (ref 3.5–5)
ALP SERPL-CCNC: 100 U/L (ref 46–116)
ALT SERPL W P-5'-P-CCNC: 35 U/L (ref 12–78)
ANION GAP SERPL CALCULATED.3IONS-SCNC: 5 MMOL/L (ref 4–13)
AST SERPL W P-5'-P-CCNC: 32 U/L (ref 5–45)
BASOPHILS # BLD AUTO: 0.04 THOUSANDS/ÂΜL (ref 0–0.1)
BASOPHILS NFR BLD AUTO: 1 % (ref 0–1)
BILIRUB SERPL-MCNC: 0.72 MG/DL (ref 0.2–1)
BUN SERPL-MCNC: 11 MG/DL (ref 5–25)
CALCIUM SERPL-MCNC: 8.5 MG/DL (ref 8.3–10.1)
CHLORIDE SERPL-SCNC: 92 MMOL/L (ref 96–108)
CHOLEST SERPL-MCNC: 150 MG/DL
CO2 SERPL-SCNC: 28 MMOL/L (ref 21–32)
CREAT SERPL-MCNC: 0.75 MG/DL (ref 0.6–1.3)
CRP SERPL QL: 4 MG/L
EOSINOPHIL # BLD AUTO: 0.62 THOUSAND/ÂΜL (ref 0–0.61)
EOSINOPHIL NFR BLD AUTO: 14 % (ref 0–6)
ERYTHROCYTE [DISTWIDTH] IN BLOOD BY AUTOMATED COUNT: 12.5 % (ref 11.6–15.1)
ERYTHROCYTE [SEDIMENTATION RATE] IN BLOOD: 19 MM/HOUR (ref 0–29)
EST. AVERAGE GLUCOSE BLD GHB EST-MCNC: 114 MG/DL
GFR SERPL CREATININE-BSD FRML MDRD: 75 ML/MIN/1.73SQ M
GLUCOSE P FAST SERPL-MCNC: 88 MG/DL (ref 65–99)
HBA1C MFR BLD: 5.6 %
HCT VFR BLD AUTO: 37.4 % (ref 34.8–46.1)
HDLC SERPL-MCNC: 61 MG/DL
HGB BLD-MCNC: 12.2 G/DL (ref 11.5–15.4)
IMM GRANULOCYTES # BLD AUTO: 0.01 THOUSAND/UL (ref 0–0.2)
IMM GRANULOCYTES NFR BLD AUTO: 0 % (ref 0–2)
INR PPP: 1 (ref 0.84–1.19)
LDLC SERPL CALC-MCNC: 73 MG/DL (ref 0–100)
LYMPHOCYTES # BLD AUTO: 0.81 THOUSANDS/ÂΜL (ref 0.6–4.47)
LYMPHOCYTES NFR BLD AUTO: 18 % (ref 14–44)
MAGNESIUM SERPL-MCNC: 1.8 MG/DL (ref 1.6–2.6)
MCH RBC QN AUTO: 31.9 PG (ref 26.8–34.3)
MCHC RBC AUTO-ENTMCNC: 32.6 G/DL (ref 31.4–37.4)
MCV RBC AUTO: 98 FL (ref 82–98)
MONOCYTES # BLD AUTO: 0.47 THOUSAND/ÂΜL (ref 0.17–1.22)
MONOCYTES NFR BLD AUTO: 10 % (ref 4–12)
NEUTROPHILS # BLD AUTO: 2.61 THOUSANDS/ÂΜL (ref 1.85–7.62)
NEUTS SEG NFR BLD AUTO: 57 % (ref 43–75)
NONHDLC SERPL-MCNC: 89 MG/DL
NRBC BLD AUTO-RTO: 0 /100 WBCS
NT-PROBNP SERPL-MCNC: 1521 PG/ML
PLATELET # BLD AUTO: 187 THOUSANDS/UL (ref 149–390)
PMV BLD AUTO: 10.6 FL (ref 8.9–12.7)
POTASSIUM SERPL-SCNC: 4 MMOL/L (ref 3.5–5.3)
PROT SERPL-MCNC: 7.1 G/DL (ref 6.4–8.4)
PROTHROMBIN TIME: 13.4 SECONDS (ref 11.6–14.5)
RBC # BLD AUTO: 3.83 MILLION/UL (ref 3.81–5.12)
SODIUM SERPL-SCNC: 125 MMOL/L (ref 135–147)
TRIGL SERPL-MCNC: 78 MG/DL
TSH SERPL DL<=0.05 MIU/L-ACNC: 1.5 UIU/ML (ref 0.45–4.5)
WBC # BLD AUTO: 4.56 THOUSAND/UL (ref 4.31–10.16)

## 2023-01-25 RX ORDER — HYDROXYZINE HYDROCHLORIDE 10 MG/1
10 TABLET, FILM COATED ORAL EVERY 6 HOURS PRN
Qty: 30 TABLET | Refills: 0 | Status: SHIPPED | OUTPATIENT
Start: 2023-01-25

## 2023-01-25 RX ORDER — TRIAMCINOLONE ACETONIDE 5 MG/G
OINTMENT TOPICAL 2 TIMES DAILY
Qty: 120 G | Refills: 1 | Status: SHIPPED | OUTPATIENT
Start: 2023-01-25 | End: 2023-02-04

## 2023-01-25 RX ORDER — PREDNISOLONE SODIUM PHOSPHATE 15 MG/5ML
20 SOLUTION ORAL 2 TIMES DAILY
Qty: 67 ML | Refills: 1 | Status: SHIPPED | OUTPATIENT
Start: 2023-01-25 | End: 2023-01-30

## 2023-01-25 NOTE — PROGRESS NOTES
Virtual Brief Visit    Patient is located in the following state in which I hold an active license [place]       Assessment/Plan:    Patient is an 46-year-old female presents in office via virtual visit with a rash noted to abdomen lower extremities and arms that she noted 2 days ago  She recently was COVID antivirals Molniprivir -which she finished already she thought it was a flare of her eczema/some type of allergic reaction to a new detergent that she used   She is very sensitive to multiple things   Denies any shortness of breath denies any having difficulty swallowing denies any cough cough has gotten much better most of the symptoms with COVID have resolved the rash is just itchy and uncomfortable  Discussed treatment option initially referred her to the ER but patient does not want she does not think is to that point  Agrees to get some blood work I also ordered an EKG and a chest x-ray to be done at the lab just to make sure everything is okay  For now I have ordered ointment to because of the rash and Benadryl also Medrol pack on standby until the lab results come back  She is aware if she is not having any scratchy voice of more so than usual shortness of breath or difficulty breathing to call 911         Problem List Items Addressed This Visit        Cardiovascular and Mediastinum    Essential hypertension    Relevant Medications    hydrOXYzine HCL (ATARAX) 10 mg tablet    triamcinolone (KENALOG) 0 5 % ointment    Other Relevant Orders    CBC and differential    Comprehensive metabolic panel    C-reactive protein    Sedimentation rate, automated    NT-BNP PRO    Protime-INR    TSH, 3rd generation with Free T4 reflex    ECG 12 lead (Completed)    XR chest pa & lateral       Other    Mixed hyperlipidemia    Relevant Medications    hydrOXYzine HCL (ATARAX) 10 mg tablet    triamcinolone (KENALOG) 0 5 % ointment    Other Relevant Orders    CBC and differential    Comprehensive metabolic panel C-reactive protein    Sedimentation rate, automated    NT-BNP PRO    Protime-INR    TSH, 3rd generation with Free T4 reflex    Lipid panel    ECG 12 lead (Completed)    XR chest pa & lateral   Other Visit Diagnoses     Allergic reaction, initial encounter    -  Primary    possibly to Molnirpivir     Relevant Medications    hydrOXYzine HCL (ATARAX) 10 mg tablet    triamcinolone (KENALOG) 0 5 % ointment    prednisoLONE (ORAPRED) 15 mg/5 mL oral solution    Other Relevant Orders    CBC and differential    Comprehensive metabolic panel    C-reactive protein    Sedimentation rate, automated    NT-BNP PRO    Protime-INR    TSH, 3rd generation with Free T4 reflex    ECG 12 lead (Completed)    XR chest pa & lateral    Rash        to legs trunk and abdomen   no issues with her face   no shortness of breath no dififculty breathing     Relevant Medications    hydrOXYzine HCL (ATARAX) 10 mg tablet    triamcinolone (KENALOG) 0 5 % ointment    prednisoLONE (ORAPRED) 15 mg/5 mL oral solution    Other Relevant Orders    CBC and differential    Comprehensive metabolic panel    C-reactive protein    Sedimentation rate, automated    NT-BNP PRO    Protime-INR    TSH, 3rd generation with Free T4 reflex    ECG 12 lead (Completed)    XR chest pa & lateral    Post-COVID syndrome        Relevant Medications    hydrOXYzine HCL (ATARAX) 10 mg tablet    triamcinolone (KENALOG) 0 5 % ointment    Other Relevant Orders    CBC and differential    Comprehensive metabolic panel    C-reactive protein    Sedimentation rate, automated    NT-BNP PRO    Protime-INR    TSH, 3rd generation with Free T4 reflex    ECG 12 lead (Completed)    XR chest pa & lateral    Acute cough        Relevant Medications    hydrOXYzine HCL (ATARAX) 10 mg tablet    triamcinolone (KENALOG) 0 5 % ointment    Other Relevant Orders    CBC and differential    Comprehensive metabolic panel    C-reactive protein    Sedimentation rate, automated    NT-BNP PRO    Protime-INR    TSH, 3rd generation with Free T4 reflex    Electrolyte abnormality        Relevant Medications    hydrOXYzine HCL (ATARAX) 10 mg tablet    triamcinolone (KENALOG) 0 5 % ointment    Other Relevant Orders    CBC and differential    Comprehensive metabolic panel    C-reactive protein    Sedimentation rate, automated    NT-BNP PRO    Protime-INR    TSH, 3rd generation with Free T4 reflex    Current use of long term anticoagulation        Relevant Orders    Protime-INR    Elevated fasting glucose        Relevant Orders    HEMOGLOBIN A1C W/ EAG ESTIMATION          Recent Visits  Date Type Provider Dept   01/20/23 Telemedicine Renee [address] [place] recent visits within past 7 days and meeting all other requirements  Today's Visits  Date Type Provider Dept   01/25/23 Telemedicine [address] [place] today's visits and meeting all other requirements  Future Appointments  No visits were found meeting these conditions    Showing future appointments within next 150 days and meeting all other requirements         Visit Time    Visit Start Time: 09:15am  Visit Stop Time: 09:35am   Total Visit Duration: 20 minutes

## 2023-01-30 ENCOUNTER — APPOINTMENT (OUTPATIENT)
Dept: LAB | Facility: CLINIC | Age: 83
End: 2023-01-30

## 2023-01-30 LAB
BILIRUB UR QL STRIP: NEGATIVE
CLARITY UR: CLEAR
COLOR UR: COLORLESS
GLUCOSE UR STRIP-MCNC: NEGATIVE MG/DL
HGB UR QL STRIP.AUTO: NEGATIVE
KETONES UR STRIP-MCNC: NEGATIVE MG/DL
LEUKOCYTE ESTERASE UR QL STRIP: NEGATIVE
NITRITE UR QL STRIP: NEGATIVE
PH UR STRIP.AUTO: 7.5 [PH]
PROT UR STRIP-MCNC: NEGATIVE MG/DL
SP GR UR STRIP.AUTO: 1.01 (ref 1–1.03)
UROBILINOGEN UR STRIP-ACNC: <2 MG/DL

## 2023-02-13 ENCOUNTER — OFFICE VISIT (OUTPATIENT)
Dept: FAMILY MEDICINE CLINIC | Facility: CLINIC | Age: 83
End: 2023-02-13

## 2023-02-13 VITALS
HEIGHT: 67 IN | HEART RATE: 84 BPM | WEIGHT: 158 LBS | BODY MASS INDEX: 24.8 KG/M2 | SYSTOLIC BLOOD PRESSURE: 164 MMHG | OXYGEN SATURATION: 96 % | DIASTOLIC BLOOD PRESSURE: 84 MMHG | TEMPERATURE: 98.2 F

## 2023-02-13 DIAGNOSIS — G93.32 POST-COVID CHRONIC FATIGUE: ICD-10-CM

## 2023-02-13 DIAGNOSIS — I70.213 ATHEROSCLER OF NATIVE ARTERY OF BOTH LEGS WITH INTERMIT CLAUDICATION (HCC): ICD-10-CM

## 2023-02-13 DIAGNOSIS — E55.9 VITAMIN D DEFICIENCY: ICD-10-CM

## 2023-02-13 DIAGNOSIS — D73.5 SPLENIC INFARCTION: ICD-10-CM

## 2023-02-13 DIAGNOSIS — U09.9 POST-COVID CHRONIC FATIGUE: ICD-10-CM

## 2023-02-13 DIAGNOSIS — E78.2 MIXED HYPERLIPIDEMIA: ICD-10-CM

## 2023-02-13 DIAGNOSIS — E87.1 HYPONATREMIA: ICD-10-CM

## 2023-02-13 DIAGNOSIS — I10 ESSENTIAL HYPERTENSION: Primary | ICD-10-CM

## 2023-02-13 DIAGNOSIS — J30.2 SEASONAL ALLERGIES: ICD-10-CM

## 2023-02-13 DIAGNOSIS — J38.00 VOCAL CORD PARESIS: ICD-10-CM

## 2023-02-13 DIAGNOSIS — I74.10 SHAGGY AORTA SYNDROME (HCC): ICD-10-CM

## 2023-02-13 DIAGNOSIS — Z98.890 HISTORY OF CAROTID ENDARTERECTOMY: ICD-10-CM

## 2023-02-13 DIAGNOSIS — M35.09 SJOGREN'S SYNDROME WITH OTHER ORGAN INVOLVEMENT (HCC): ICD-10-CM

## 2023-02-13 RX ORDER — LABETALOL 100 MG/1
200 TABLET, FILM COATED ORAL EVERY 8 HOURS
Qty: 540 TABLET | Refills: 1 | Status: SHIPPED | OUTPATIENT
Start: 2023-02-13 | End: 2023-05-14

## 2023-02-13 RX ORDER — CLOPIDOGREL BISULFATE 75 MG/1
75 TABLET ORAL DAILY
Qty: 90 TABLET | Refills: 1 | Status: SHIPPED | OUTPATIENT
Start: 2023-02-13 | End: 2023-05-14

## 2023-02-13 RX ORDER — LOSARTAN POTASSIUM 100 MG/1
100 TABLET ORAL DAILY
Qty: 90 TABLET | Refills: 1 | Status: SHIPPED | OUTPATIENT
Start: 2023-02-13 | End: 2023-05-14

## 2023-02-13 RX ORDER — ASPIRIN 81 MG/1
81 TABLET, COATED ORAL DAILY
Qty: 90 TABLET | Refills: 1 | Status: SHIPPED | OUTPATIENT
Start: 2023-02-13

## 2023-02-13 RX ORDER — MONTELUKAST SODIUM 10 MG/1
10 TABLET ORAL
Qty: 90 TABLET | Refills: 1 | Status: SHIPPED | OUTPATIENT
Start: 2023-02-13 | End: 2023-05-14

## 2023-02-13 RX ORDER — ATORVASTATIN CALCIUM 40 MG/1
80 TABLET, FILM COATED ORAL DAILY
Qty: 180 TABLET | Refills: 1 | Status: SHIPPED | OUTPATIENT
Start: 2023-02-13 | End: 2023-05-14

## 2023-02-13 NOTE — PROGRESS NOTES
Depression Screening and Follow-up Plan: Patient was screened for depression during today's encounter  They screened negative with a PHQ-2 score of 0  Falls Plan of Care: balance, strength, and gait training instructions were provided  Recommended assistive device to help with gait and balance  Medications that increase falls were reviewed  Assessed feet and footwear  Vitamin D supplementation was recommended  Home safety education provided  Urinary Incontinence Plan of Care: counseling topics discussed: practice Kegel (pelvic floor strengthening) exercises, use restroom every 2 hours, limit alcohol, caffeine, spicy foods, and acidic foods, keeping a bladder diary, limiting fluid intake 3-4 hours before bed, weight loss, preventing constipation, taking fluid pills at a time when you can get to bathroom easily, improving blood sugar control, limiting fluid intake to 60 oz  per day, wearing ROVERTO stockings for edema control and bladder retraining  Assessment/Plan:    Pt is a [de-identified] yr old female   Presents in office for 4-6  Week post COVID follow up and follow up on multiple diagnoses   HTN - still not a goal however it is usually higher in am and goes down to 120s'-130s over 80s at home  She also does have white coat syndrome  Hyperlipidemia- post  for carotid stenosis she is being monitored by Vascular     Also sees Cardiology  Post COVID [place] doing [id number] [address]   Was followed up by Nephrology for low Na levels repeat Na up to 130 from 125   She was instructed to reduce her water intake and discussed to increase protein intake she has follow up coming up with both nephrology and cardiology     Discussed healthy diet and limited hydration      Problem List Items Addressed This Visit        Cardiovascular and Mediastinum    Essential hypertension - Primary    Relevant Medications    losartan (COZAAR) 100 MG tablet    labetalol (NORMODYNE) 100 mg tablet    Other Relevant Orders    Comprehensive metabolic panel CBC and differential    TSH, 3rd generation with Free T4 reflex    UA (URINE) with reflex to Scope    NT-BNP PRO    Vitamin D 25 hydroxy    Lipid panel    Atheroscler 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 (HCC)    Relevant Orders    Comprehensive metabolic panel    CBC and differential    TSH, 3rd generation with Free T4 reflex    UA (URINE) with reflex to Scope    NT-BNP PRO    Vitamin D 25 hydroxy    Shaggy aorta syndrome (HCC)    Relevant Orders    Comprehensive metabolic panel    CBC and differential    TSH, 3rd generation with Free T4 reflex    UA (URINE) with reflex to Scope    NT-BNP PRO    Vitamin D 25 hydroxy       Other    History of carotid endarterectomy    Relevant Orders    Comprehensive metabolic panel    CBC and differential    TSH, 3rd generation with Free T4 reflex    UA (URINE) with reflex to Scope    NT-BNP PRO    Vitamin D 25 hydroxy    Hyponatremia    Relevant Orders    Comprehensive metabolic panel    CBC and differential    TSH, 3rd generation with Free T4 reflex    UA (URINE) with reflex to Scope    NT-BNP PRO    Vitamin D 25 hydroxy    Mixed hyperlipidemia    Relevant Medications    atorvastatin (LIPITOR) 40 mg tablet    Other Relevant Orders    Comprehensive metabolic panel    CBC and differential    TSH, 3rd generation with Free T4 reflex    UA (URINE) with reflex to Scope    NT-BNP PRO    Vitamin D 25 hydroxy    Vitamin D deficiency    Relevant Orders    Comprehensive metabolic panel    CBC and differential    TSH, 3rd generation with Free T4 reflex    UA (URINE) with reflex to Scope    NT-BNP PRO    Vitamin D 25 hydroxy    Vocal fold paresis - left, severe    Relevant Orders    Comprehensive metabolic panel    CBC and differential    TSH, 3rd generation with Free T4 reflex    UA (URINE) with reflex to Scope    NT-BNP PRO    Vitamin D 25 hydroxy    Sjogren's syndrome with other organ involvement (HCC)    Relevant Orders    Comprehensive metabolic panel    CBC and differential    TSH, 3rd generation with Free T4 reflex    UA (URINE) with reflex to Scope    NT-BNP PRO    Vitamin D 25 hydroxy   Other Visit Diagnoses     Post-COVID chronic fatigue        Relevant Orders    Comprehensive metabolic panel    CBC and differential    TSH, 3rd generation with Free T4 reflex    UA (URINE) with reflex to Scope    NT-BNP PRO    Vitamin D 25 hydroxy    Seasonal allergies        Relevant Medications    montelukast (SINGULAIR) 10 mg tablet    Other Relevant Orders    Comprehensive metabolic panel    CBC and differential    TSH, 3rd generation with Free T4 reflex    UA (URINE) with reflex to Scope    NT-BNP PRO    Vitamin D 25 hydroxy    Splenic infarction        Relevant Medications    clopidogrel (PLAVIX) 75 mg tablet    Aspirin Low Dose 81 MG EC tablet    Other Relevant Orders    Comprehensive metabolic panel    CBC and differential    TSH, 3rd generation with Free T4 reflex    UA (URINE) with reflex to Scope    NT-BNP PRO    Vitamin D 25 hydroxy            Subjective:      Patient ID: Daniel Hodges is a [de-identified] y o  female  Pt is a [de-identified] yr old female   Presents in office for 4-6  Week post COVID follow up and follow up on multiple diagnoses   HTN - still not a goal however it is usually higher in am and goes down to 120s'-130s over 80s at home  She also does have white coat syndrome  Hyperlipidemia- post  for carotid stenosis she is being monitored by Vascular     Also sees Cardiology  Post COVID she is doing [place] [place]  Was followed up by Nephrology for low Na levels repeat Na up to 130 from 125   She was instructed to reduce her water intake and discussed to increase protein intake she has follow up coming up with both nephrology and cardiology           The following portions of the patient's history were reviewed and updated as appropriate:   Past Medical History:  She has a past medical history of Allergic rhinitis, Anxiety disorder, Arthritis, Dizziness, Dry eye syndrome, Essential hypertension, malignant, Pure hypercholesterolemia, and Splenic infarction  ,  _______________________________________________________________________  Medical Problems:  does not have any pertinent problems on file ,  _______________________________________________________________________  Past Surgical History:   has a past surgical history that includes Sinus surgery; Cardiac surgery (2019); Throat surgery (2000); Eye surgery; and Cosmetic surgery  ,  _______________________________________________________________________  Family History:  family history includes Cancer in her maternal aunt; Heart disease in her mother; Other in her maternal grandfather and sister  ,  _______________________________________________________________________  Social History:   reports that she quit smoking about 6 years ago  Her smoking use included cigarettes  She has a 12 50 pack-year smoking history  She has never used smokeless tobacco  She reports current alcohol use  She reports that she does not use drugs  ,  _______________________________________________________________________  Allergies:  is allergic to potassium chloride, xanax [alprazolam], and latex     _______________________________________________________________________  Current Outpatient Medications   Medication Sig Dispense Refill   • ascorbic acid (VITAMIN C) 1000 MG tablet Take 1,000 mg by mouth     • Aspirin Low Dose 81 MG EC tablet Take 1 tablet (81 mg total) by mouth daily 90 tablet 1   • atorvastatin (LIPITOR) 40 mg tablet Take 2 tablets (80 mg total) by mouth daily 180 tablet 1   • azelastine (ASTELIN) 0 1 % nasal spray 1 spray into each nostril 2 (two) times a day 30 mL 11   • B Complex Vitamins (B COMPLEX 1 PO) Take 2 tablets by mouth daily Each tablet is 40mg- Patient is taking liquid form       • clobetasol (TEMOVATE) 0 05 % external solution Apply topically 2 (two) times a day To scalp for psoriasis 150 mL 1   • clopidogrel (PLAVIX) 75 mg tablet Take 1 tablet (75 mg total) by mouth daily 90 tablet 1   • cycloSPORINE (RESTASIS) 0 05 % ophthalmic emulsion Apply 1 drop to eye 2 (two) times a day     • fluticasone (FLONASE) 50 mcg/act nasal spray SPRAY 1 SPRAY INTO EACH NOSTRIL EVERY DAY 48 mL 1   • labetalol (NORMODYNE) 100 mg tablet Take 2 tablets (200 mg total) by mouth every 8 (eight) hours 540 tablet 1   • losartan (COZAAR) 100 MG tablet Take 1 tablet (100 mg total) by mouth daily 90 tablet 1   • montelukast (SINGULAIR) 10 mg tablet Take 1 tablet (10 mg total) by mouth daily at bedtime 90 tablet 1   • multivitamin (THERAGRAN) TABS Take 1 tablet by mouth daily     • olopatadine HCl (PATADAY) 0 2 % opth drops Apply 1 drop to eye 2 (two) times a day     • Omega-3 Fatty Acids (fish oil) 1,000 mg Take 2 g by mouth 2 (two) times a day     • White Petrolatum-Mineral Oil (artificial tears) Administer 1 application to both eyes as needed for dry eyes 1 drop in each eye     • triamcinolone (KENALOG) 0 5 % ointment Apply topically 2 (two) times a day for 10 days 120 g 1     No current facility-administered medications for this visit      _______________________________________________________________________  Review of Systems   Constitutional: Positive for fatigue  Negative for chills, fever and unexpected weight change  HENT: Positive for trouble swallowing  Negative for congestion, postnasal drip and sore throat  Swallowing issues   Does have some some vocal paralysis    Eyes: Negative for pain, discharge and itching  Respiratory: Negative for cough and shortness of breath  Cardiovascular: Negative for chest pain and leg swelling  HTN - labetalol was increased to three times a day   Under care of Cardiology - still not at goal in office however she states that at home is controlled   hyperlipidemia    in the past    Gastrointestinal: Negative for abdominal distention and abdominal pain  Endocrine: Positive for cold intolerance     Genitourinary: Negative for difficulty urinating, dysuria and flank pain  History of Na being low under care of Nephrology    Musculoskeletal: Negative for back pain (upper back pain ) and myalgias  Skin: Negative for rash (still has some scalp erythema but much better )  Allergic/Immunologic: Positive for environmental allergies  Neurological: Negative for dizziness, weakness and headaches  Hematological: Negative for adenopathy  Psychiatric/Behavioral: Negative for sleep disturbance and suicidal ideas  The patient is not nervous/anxious  Objective:  Vitals:    02/13/23 1104   BP: 164/84   BP Location: Left arm   Patient Position: Sitting   Cuff Size: Adult   Pulse: 84   Temp: 98 2 °F (36 8 °C)   SpO2: 96%   Weight: 71 7 kg (158 lb)   Height: 5' 7" (1 702 m)     Body mass index is 24 75 kg/m²  Physical Exam  Vitals and nursing note reviewed  Constitutional:       Appearance: Normal appearance  HENT:      Head: Atraumatic  Nose: No congestion or rhinorrhea  Mouth/Throat:      Pharynx: No posterior oropharyngeal erythema  Eyes:      Extraocular Movements: Extraocular movements intact  Cardiovascular:      Rate and Rhythm: Normal rate and regular rhythm  Pulses: Normal pulses  Heart sounds: Normal heart sounds  Comments: Elevated BP   Pulmonary:      Breath sounds: Normal breath sounds  Abdominal:      Palpations: Abdomen is soft  Musculoskeletal:         General: Normal range of motion  Cervical back: Normal range of motion  Skin:     General: Skin is warm  Capillary Refill: Capillary refill takes less than 2 seconds  Neurological:      Mental Status: She is alert and oriented to person, place, and time     Psychiatric:         Mood and Affect: Mood normal          Behavior: Behavior normal            Basic Metabolic Panel  Specimen:  Blood - Blood, Venous   Ref Range & Units 2 wk ago   Glucose 70 - 99 mg/dL 93    BUN 8 - 27 mg/dL 13    Creatinine 0 57 - 1 00 mg/dL 0 76    eGFR CKD-EPI CR 2021 >59 mL/min/1 73 79    BUN/Creatinine Ratio 12 - 28 17    Sodium 134 - 144 mmol/L 130 Low     Potassium 3 5 - 5 2 mmol/L 4 0    Chloride 96 - 106 mmol/L 91 Low     Bicarbonate (CO2) 20 - 29 mmol/L 28    Calcium 8 7 - 10 3 mg/dL 9 2    Resulting Agency  [facility]   Narrative  Performed by [facility]  Performed at: [address]  [id number]   : Isaac Ramirez MD, Phone:  [phone]  Specimen Collected: 01/30/23 11:01 AM Last Resulted: 01/31/23  7:05 AM   Received From: [facility]  Result Received: 02/13/23 10:53 AM        Contains abnormal data CBC and differential  Order: [id number]   Status: Final result      Visible to patient: Yes (not seen)      Next appt: 08/14/2023 at 11:00 AM in [place], [place])      Dx: Mixed hyperlipidemia;  Rash; Essential       3 Result Notes  Component Ref Range & Units 1/25/23 10:20 AM    WBC 4 31 - 10 16 Thousand/uL 4 56    RBC 3 81 - 5 12 Million/uL 3 83    Hemoglobin 11 5 - 15 4 g/dL 12 2    Hematocrit 34 8 - 46 1 % 37 4    MCV 82 - 98 fL 98    MCH 26 8 - 34 3 pg 31 9    MCHC 31 4 - 37 4 g/dL 32 6    RDW 11 6 - 15 1 % 12 5    MPV 8 9 - 12 7 fL 10 6    Platelets 017 - 626 Thousands/uL 187    nRBC /100 WBCs 0    Neutrophils Relative 43 - 75 % 57    Immat GRANS % 0 - 2 % 0    Lymphocytes Relative 14 - 44 % 18    Monocytes Relative 4 - 12 % 10    Eosinophils Relative 0 - 6 % 14 High     Basophils Relative 0 - 1 % 1    Neutrophils Absolute 1 85 - 7 62 Thousands/µL 2 61    Immature Grans Absolute 0 00 - 0 20 Thousand/uL 0 01    Lymphocytes Absolute 0 60 - 4 47 Thousands/µL 0 81    Monocytes Absolute 0 17 - 1 22 Thousand/µL 0 47    Eosinophils Absolute 0 00 - 0 61 Thousand/µL 0 62 High     Basophils Absolute 0 00 - 0 10 Thousands/µL 0 04               Specimen Collected: 01/25/23 10:20 AM Last Resulted: 01/25/23  3:19 PM           TSH, 3rd generation with Free T4 reflex  Order: [id number]   Status: Final result      Visible to patient: Yes (not seen)      Next [facility])      Dx: Mixed hyperlipidemia; Rash; Essential       3 Result Notes  Component Ref Range & Units 1/25/23 10:20 AM    TSH 3RD GENERATON 0 450 - 4 500 uIU/mL 1 500         Lipid panel  Order: [id number]   Status: Final result      Visible to patient: Yes (not seen)      Next appt: 08/14/2023 at 11:00 AM in [facility], [address] [facility] [address])      Dx: Mixed hyperlipidemia      3 Result Notes  Component Ref Range & Units 1/25/23 10:20 AM 9/12/22  7:28 AM 11/24/21  7:15 AM 4/5/21  7:37 AM 12/14/20 11:59 AM 9/9/20  7:12 AM   Cholesterol See Comment mg/dL 150  154 R  155 R  137 R  139 R  141 R    Comment: Cholesterol:         Pediatric <18 Years         Desirable          <170 mg/dL       Borderline High    170-199 mg/dL       High               >=200 mg/dL         Adult >=18 Years             Desirable         <200 mg/dL       Borderline High   200-239 mg/dL       High             >239 mg/dL    Triglycerides See Comment mg/dL 78  53 R  40 R  45 R  55 R  47 R    Comment: Triglyceride:      0-9Y            <75mg/dL      10Y-17Y         <90 mg/dL        >=18Y      Normal          <150 mg/dL      Borderline High 150-199 mg/dL      High            200-499 mg/dL        Very High       >499 mg/dL     Specimen collection should occur prior to N-Acetylcysteine or Metamizole administration due to the potential for falsely depressed results  HDL, Direct >=50 mg/dL 61  64 R  63 R  63 R  60 R  62 R    Comment: Specimen collection should occur prior to Metamizole administration due to the potential for falsley depressed results     LDL Calculated 0 - 100 mg/dL 73  79 R  83 R  64 R  67 R  68 R    Comment: LDL Cholesterol:     Optimal           <100 mg/dl     Near Optimal      100-129 mg/dl     Above Optimal       Borderline High 130-159 mg/dl       High            160-189 mg/dl     Very High       >189 mg/dl           This screening LDL is a calculated result  It does not have the accuracy of the Direct Measured LDL in the monitoring of patients with hyperlipidemia and/or statin therapy  Direct Measure LDL ([id number]) must be ordered separately in these patients  Non-HDL-Chol (CHOL-HDL) mg/dl 89                    Specimen Collected: 01/25/23 10:20 AM Last Resulted: 01/25/23  3:43 PM           HEMOGLOBIN A1C W/ EAG ESTIMATION  Order: [id number]   Status: Final result      Visible to patient: Yes (not seen)      Next appt: 08/14/2023 at 11:00 AM in Family Medicine [address])      Dx: Elevated fasting glucose      3 Result Notes  Component Ref Range & Units 1/25/23 10:20 AM 9/12/22  7:28 AM   Hemoglobin A1C Normal 3 8-5 6%; PreDiabetic 5 7-6 4%; Diabetic >=6 5%; Glycemic control for adults with diabetes <7 0% % 5 6  5 6 R, CM    EAG mg/dl 114  114 R        Lipid panel  Order: [id number]   Status: Final result      Visible to patient: Yes (not seen)      Next appt: 08/14/2023 at 11:00 AM in [address] [place] [address]      Dx:  Mixed hyperlipidemia      3 Result Notes  Component Ref Range & Units 1/25/23 10:20 AM 9/12/22  7:28 AM 11/24/21  7:15 AM 4/5/21  7:37 AM 12/14/20 11:59 AM 9/9/20  7:12 AM   Cholesterol See Comment mg/dL 150  154 R  155 R  137 R  139 R  141 R    Comment: Cholesterol:         Pediatric <18 Years         Desirable          <170 mg/dL       Borderline High    170-199 mg/dL       High               >=200 mg/dL         Adult >=18 Years             Desirable         <200 mg/dL       Borderline High   200-239 mg/dL       High             >239 mg/dL    Triglycerides See Comment mg/dL 78  53 R  40 R  45 R  55 R  47 R    Comment: Triglyceride:      0-9Y            <75mg/dL      10Y-17Y         <90 mg/dL        >=18Y      Normal          <150 mg/dL      Borderline High 150-199 mg/dL      High            200-499 mg/dL        Very High       >499 mg/dL Specimen collection should occur prior to N-Acetylcysteine or Metamizole administration due to the potential for falsely depressed results  HDL, Direct >=50 mg/dL 61  64 R  63 R  63 R  60 R  62 R    Comment: Specimen collection should occur prior to Metamizole administration due to the potential for falsley depressed results  LDL Calculated 0 - 100 mg/dL 73  79 R  83 R  64 R  67 R  68 R    Comment: LDL Cholesterol:     Optimal           <100 mg/dl     Near Optimal      100-129 mg/dl     Above Optimal       Borderline High 130-159 mg/dl       High            160-189 mg/dl       Very High       >189 mg/dl           This screening LDL is a calculated result  It does not have the accuracy of the Direct Measured LDL in the monitoring of patients with hyperlipidemia and/or statin therapy  Direct Measure LDL ([id number]) must be ordered separately in these patients  Non-HDL-Chol (CHOL-HDL) mg/dl 89                    Specimen Collected: 01/25/23 10:20 AM Last Resulted: 01/25/23  3:43 PM           Vitamin D 25 hydroxy  Order: [id number]   Status: Final result      Visible to patient: Yes (not seen)      Next appt: 08/14/2023 at 11:00 AM in Family Medicine Jonelle Esparza, [facility])      Dx: Hyponatremia; Essential hypertension;         3 Result Notes  Component Ref Range & Units 1/25/23 10:20 AM    Vit D, 25-Hydroxy 30 0 - 100 0 ng/mL 34 2

## 2023-02-28 ENCOUNTER — TELEPHONE (OUTPATIENT)
Dept: FAMILY MEDICINE CLINIC | Facility: CLINIC | Age: 83
End: 2023-02-28

## 2023-02-28 NOTE — TELEPHONE ENCOUNTER
Patient got a letter from her insurance questioning her Atorvastatin 80mg (2 tabs 40mg since the 80mg tab was too big), she reached out to the Dr who originally prescribed this dose and have it changed back to 80mg 1 tab daily

## 2023-05-17 ENCOUNTER — TELEPHONE (OUTPATIENT)
Dept: FAMILY MEDICINE CLINIC | Facility: CLINIC | Age: 83
End: 2023-05-17

## 2023-05-17 NOTE — TELEPHONE ENCOUNTER
Patient called asking if she can a MVI that supports brain health, she is taking MVI, Vit B, Vit C and omega  She wants to make sure she can take the Brain MVI with all the other vitamins she takes  She is thinking she should stop the reg MVI and use the Brain MVI in place of it

## 2023-06-13 DIAGNOSIS — I10 ESSENTIAL HYPERTENSION: ICD-10-CM

## 2023-06-13 DIAGNOSIS — D73.5 SPLENIC INFARCTION: ICD-10-CM

## 2023-06-13 DIAGNOSIS — J30.2 SEASONAL ALLERGIES: ICD-10-CM

## 2023-06-13 RX ORDER — LOSARTAN POTASSIUM 100 MG/1
TABLET ORAL
Qty: 90 TABLET | Refills: 1 | Status: SHIPPED | OUTPATIENT
Start: 2023-06-13

## 2023-06-13 RX ORDER — LABETALOL 100 MG/1
200 TABLET, FILM COATED ORAL EVERY 8 HOURS
Qty: 540 TABLET | Refills: 1 | Status: SHIPPED | OUTPATIENT
Start: 2023-06-13 | End: 2023-09-11

## 2023-06-13 RX ORDER — MONTELUKAST SODIUM 10 MG/1
TABLET ORAL
Qty: 90 TABLET | Refills: 1 | Status: SHIPPED | OUTPATIENT
Start: 2023-06-13

## 2023-06-13 RX ORDER — SALICYLIC ACID 40 %
ADHESIVE PATCH, MEDICATED TOPICAL
Qty: 90 TABLET | Refills: 1 | Status: SHIPPED | OUTPATIENT
Start: 2023-06-13

## 2023-06-13 RX ORDER — CLOPIDOGREL BISULFATE 75 MG/1
TABLET ORAL
Qty: 90 TABLET | Refills: 1 | Status: SHIPPED | OUTPATIENT
Start: 2023-06-13

## 2023-08-09 ENCOUNTER — RA CDI HCC (OUTPATIENT)
Dept: OTHER | Facility: HOSPITAL | Age: 83
End: 2023-08-09

## 2023-08-09 NOTE — PROGRESS NOTES
[address] [facility] coding opportunities       Chart reviewed, no opportunity found: CHART REVIEWED, NO OPPORTUNITY FOUND      Previous suggestion used 2/13/23 I70.213  Patients Insurance     Medicare Insurance: [id number]

## 2023-09-15 ENCOUNTER — OFFICE VISIT (OUTPATIENT)
Dept: FAMILY MEDICINE CLINIC | Facility: CLINIC | Age: 83
End: 2023-09-15
Payer: COMMERCIAL

## 2023-09-15 VITALS
BODY MASS INDEX: 24.01 KG/M2 | OXYGEN SATURATION: 99 % | TEMPERATURE: 98.1 F | WEIGHT: 153 LBS | SYSTOLIC BLOOD PRESSURE: 168 MMHG | HEART RATE: 69 BPM | HEIGHT: 67 IN | DIASTOLIC BLOOD PRESSURE: 78 MMHG

## 2023-09-15 DIAGNOSIS — Z85.828 HISTORY OF SKIN CANCER: ICD-10-CM

## 2023-09-15 DIAGNOSIS — I74.10 SHAGGY AORTA SYNDROME (HCC): ICD-10-CM

## 2023-09-15 DIAGNOSIS — I70.213 ATHEROSCLER OF NATIVE ARTERY OF BOTH LEGS WITH INTERMIT CLAUDICATION (HCC): ICD-10-CM

## 2023-09-15 DIAGNOSIS — I10 PRIMARY HYPERTENSION: Primary | ICD-10-CM

## 2023-09-15 DIAGNOSIS — I10 ESSENTIAL HYPERTENSION: ICD-10-CM

## 2023-09-15 DIAGNOSIS — E87.1 HYPONATREMIA: ICD-10-CM

## 2023-09-15 DIAGNOSIS — E78.2 MIXED HYPERLIPIDEMIA: ICD-10-CM

## 2023-09-15 DIAGNOSIS — Z00.00 MEDICARE ANNUAL WELLNESS VISIT, SUBSEQUENT: ICD-10-CM

## 2023-09-15 PROCEDURE — G0444 DEPRESSION SCREEN ANNUAL: HCPCS | Performed by: NURSE PRACTITIONER

## 2023-09-15 PROCEDURE — G0439 PPPS, SUBSEQ VISIT: HCPCS | Performed by: NURSE PRACTITIONER

## 2023-09-15 PROCEDURE — 99214 OFFICE O/P EST MOD 30 MIN: CPT | Performed by: NURSE PRACTITIONER

## 2023-09-15 RX ORDER — ATORVASTATIN CALCIUM 80 MG/1
TABLET, FILM COATED ORAL
COMMUNITY
Start: 2023-07-07

## 2023-09-15 NOTE — PROGRESS NOTES
Assessment and Plan:     Pt is a 82 yr old female   Presents in office for 6 month follow up   HTN - elevated in office she does have white coat syndrome but I reviewed her home Bps average 130s-140s/ 80s - feels well no chest pain no shortness of breath and not much edema   Continues to see Nephrology for  Hyponatremia - last na level 129 last GFR 83   We will continue to monitor   She also follows up with Vascular specialist for monitoring of the carotids and the aorta we will continue to monitor   hyperlipemia at goal and stable - continue high dose of Lipitor 80 mg   Denies any major issues today  Labs reviewed   Medicare wellness done   We will continue 6 months follow up and as needed     Problem List Items Addressed This Visit        Cardiovascular and Mediastinum    Shaggy aorta syndrome (HCC)       Other    Hyponatremia    Mixed hyperlipidemia    Relevant Medications    atorvastatin (LIPITOR) 80 mg tablet   Other Visit Diagnoses     Primary hypertension    -  Primary    stable at home   has white coat syndrome     History of skin cancer        Relevant Orders    Ambulatory Referral to Dermatology    Medicare annual wellness visit, subsequent              Depression Screening and Follow-up Plan: Patient was screened for depression during today's encounter. They screened negative with a PHQ-2 score of 0. Falls Plan of Care: balance, strength, and gait training instructions were provided. Recommended assistive device to help with gait and balance. Medications that increase falls were reviewed. Assessed feet and footwear. Vitamin D supplementation was recommended. Home safety education provided.      Urinary Incontinence Plan of Care: counseling topics discussed: practice Kegel (pelvic floor strengthening) exercises, use restroom every 2 hours, limit alcohol, caffeine, spicy foods, and acidic foods, keeping a bladder diary, limiting fluid intake 3-4 hours before bed, weight loss, preventing constipation, taking fluid pills at a time when you can get to bathroom easily, improving blood sugar control, limiting fluid intake to 60 oz. per day, wearing ROVERTO stockings for edema control and bladder retraining. Preventive health issues were discussed with patient, and age appropriate screening tests were ordered as noted in patient's After Visit Summary. Personalized health advice and appropriate referrals for health education or preventive services given if needed, as noted in patient's After Visit Summary. History of Present Illness:     Patient presents for a Medicare Wellness Visit    HPI   Patient Care Team:  Cathy Byoer as PCP - General (Nurse Practitioner)     Review of Systems:     Review of Systems   Constitutional: Positive for fatigue. Negative for chills, fever and unexpected weight change. HENT: Positive for trouble swallowing. Negative for congestion, postnasal drip and sore throat. Swallowing issues   Does have some some vocal paralysis    Eyes: Negative for pain, discharge and itching. Respiratory: Negative for cough and shortness of breath. Cardiovascular: Negative for chest pain and leg swelling. HTN - labetalol was increased to three times a day   Under care of Cardiology - still not at goal in office however she states that at home is controlled - reviewed log   hyperlipidemia - stable    in the past - continues to see Vascular    Gastrointestinal: Negative for abdominal distention and abdominal pain. Endocrine: Positive for cold intolerance. Genitourinary: Negative for difficulty urinating, dysuria and flank pain. History of Na being low under care of Nephrology    Musculoskeletal: Negative for back pain (upper back pain ) and myalgias. Skin: Negative for rash (still has some scalp erythema but much better ). Allergic/Immunologic: Positive for environmental allergies. Neurological: Negative for dizziness, weakness and headaches. Hematological: Negative for adenopathy. Psychiatric/Behavioral: Negative for sleep disturbance and suicidal ideas. The patient is not nervous/anxious.          Problem List:     Patient Active Problem List   Diagnosis   • Dysphonia   • Pharyngoesophageal dysphagia   • Seasonal allergic rhinitis   • History of tobacco use   • Muscle tension dysphonia   • Glottic insufficiency   • Paralysis of left vocal fold   • History of carotid endarterectomy   • TULIO positive   • Essential hypertension   • Hyponatremia   • Mixed hyperlipidemia   • Varicose veins of bilateral lower extremities with pain   • Vitamin D deficiency   • Vocal fold paresis - left, severe   • Atheroscler 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 (HCC)   • Shaggy aorta syndrome (HCC)   • Edema of lower extremity   • Sjogren's syndrome with other organ involvement ([address])      Past Medical and Surgical History:     Past Medical History:   Diagnosis Date   • Allergic rhinitis    • Anxiety disorder    • Arthritis    • Dizziness    • Dry eye syndrome    • Essential hypertension, malignant    • Pure hypercholesterolemia    • Splenic infarction      Past Surgical History:   Procedure Laterality Date   • CARDIAC SURGERY  2019    Blocked Carotid Artery   • COSMETIC SURGERY      Scalp with 150 stitches   • EYE SURGERY     • SINUS SURGERY     • THROAT SURGERY  2000    Removal of fishbone      Family History:     Family History   Problem Relation Age of Onset   • Heart disease Mother    • Other Sister         asbestos poisoning   • Other Maternal Grandfather         cardiomegaly   • Cancer Maternal Aunt         ovarian      Social History:     Social History     Socioeconomic History   • Marital status: /Civil Union     Spouse name: None   • Number of children: None   • Years of education: None   • Highest education level: None   Occupational History   • None   Tobacco Use   • Smoking status: Former     Packs/day: 0.50     Years: 25.00 Total pack years: 12.50     Types: Cigarettes     Quit date: 2017     Years since quittin.7   • Smokeless tobacco: Never   Vaping Use   • Vaping Use: Never used   Substance and Sexual Activity   • Alcohol use: Yes     Comment: Socially   • Drug use: Never   • Sexual activity: None   Other Topics Concern   • None   Social History Narrative    · Most recent tobacco use screenin2019      · Do you currently or have you served in the [address] [place] [address] Street:   No      · Were you activated, into active duty, as a member of the [employer] or as a Reservist:   No     Social Determinants of Health     Financial Resource Strain: Unknown (9/15/2023)    Overall Financial Resource Strain (CARDIA)    • Difficulty of Paying Living Expenses: Patient refused   Food Insecurity: Not on file   Transportation Needs: No Transportation Needs (9/15/2023)    PRAPARE - Transportation    • Lack of Transportation (Medical): No    • Lack of Transportation (Non-Medical): No   Physical Activity: Not on file   Stress: Not on file   Social Connections: Not on file   Intimate Partner Violence: Not on file   Housing Stability: Not on file      Medications and Allergies:     Current Outpatient Medications   Medication Sig Dispense Refill   • ascorbic acid (VITAMIN C) 1000 MG tablet Take 1,000 mg by mouth     • atorvastatin (LIPITOR) 80 mg tablet TAKE 1 TABLET BY MOUTH EVERY DAY AT NIGHT     • B Complex Vitamins (B COMPLEX 1 PO) Take 2 tablets by mouth daily Each tablet is 40mg- Patient is taking liquid form.      • clobetasol (TEMOVATE) 0.05 % external solution Apply topically 2 (two) times a day To scalp for psoriasis 150 mL 1   • clopidogrel (PLAVIX) 75 mg tablet TAKE 1 TABLET BY MOUTH EVERY DAY 90 tablet 1   • [facility] Aspirin Low Dose 81 MG EC tablet TAKE 1 TABLET BY MOUTH EVERY DAY 90 tablet 1   • cycloSPORINE (RESTASIS) 0.05 % ophthalmic emulsion Apply 1 drop to eye 2 (two) times a day     • fluticasone (FLONASE) 50 mcg/act nasal spray SPRAY 1 SPRAY INTO EACH NOSTRIL EVERY DAY 48 mL 1   • losartan (COZAAR) 100 MG tablet TAKE 1 TABLET BY MOUTH EVERY DAY 90 tablet 1   • montelukast (SINGULAIR) 10 mg tablet TAKE 1 TABLET BY MOUTH DAILY AT BEDTIME 90 tablet 1   • multivitamin (THERAGRAN) TABS Take 1 tablet by mouth daily     • olopatadine HCl (PATADAY) 0.2 % opth drops Apply 1 drop to eye 2 (two) times a day     • Omega-3 Fatty Acids (fish oil) 1,000 mg Take 2 g by mouth 2 (two) times a day     • White Petrolatum-Mineral Oil (artificial tears) Administer 1 application to both eyes as needed for dry eyes 1 drop in each eye     • labetalol (NORMODYNE) 100 mg tablet TAKE 2 TABLETS (200 MG TOTAL) BY MOUTH EVERY 8 (EIGHT) HOURS 540 tablet 1   • triamcinolone (KENALOG) 0.5 % ointment Apply topically 2 (two) times a day for 10 days 120 g 1     No current facility-administered medications for this visit. Allergies   Allergen Reactions   • Potassium Chloride Other (See Comments)   • Xanax [Alprazolam]    • Latex Rash      Immunizations:     Immunization History   Administered Date(s) Administered   • COVID-19 MODERNA VACC 0.5 ML IM 02/05/2021, 03/05/2021, 12/21/2021, 12/21/2021, 12/21/2021   • INFLUENZA 11/01/2018, 11/20/2018, 09/13/2020, 10/14/2021, 10/14/2021, 11/08/2022, 11/08/2022   • Influenza Split High Dose Preservative Free IM 10/30/2017, 11/01/2019, 09/13/2020   • Influenza, high dose seasonal 0.7 mL 09/13/2020   • Influenza, seasonal, injectable 12/20/2000, 09/29/2021   • Pneumococcal Conjugate 13-Valent 12/05/2018   • Pneumococcal Polysaccharide PPV23 12/01/2018      Health Maintenance:         Topic Date Due   • Hepatitis C Screening  Completed         Topic Date Due   • COVID-19 Vaccine (6 - Moderna series) 02/15/2022   • Influenza Vaccine (1) 09/01/2023      Medicare Screening Tests and Risk Assessments:     Schuyler Kennedy is here for her Subsequent Wellness visit. Health Risk Assessment:   Patient rates overall health as good.  Patient feels that their physical health rating is much better. Patient is satisfied with their life. Eyesight was rated as same. Hearing was rated as same. Patient feels that their emotional and mental health rating is same. Patients states they are sometimes angry. Patient states they are sometimes unusually tired/fatigued. Pain experienced in the last 7 days has been some. Patient's pain rating has been 8/10. Patient states that she has experienced no weight loss or gain in last 6 months. Depression Screening:   PHQ-2 Score: 0      Fall Risk Screening: In the past year, patient has experienced: no history of falling in past year      Urinary Incontinence Screening:   Patient has not leaked urine accidently in the last six months. Home Safety:  Patient does not have trouble with stairs inside or outside of their home. Patient has working smoke alarms and has working carbon monoxide detector. Home safety hazards include: none. Nutrition:   Current diet is Regular. Medications:   Patient is currently taking over-the-counter supplements. OTC medications include: Vitamins. Patient is able to manage medications. Activities of Daily Living (ADLs)/Instrumental Activities of Daily Living (IADLs):   Walk and transfer into and out of bed and chair?: Yes  Dress and groom yourself?: Yes    Bathe or shower yourself?: Yes    Feed yourself? Yes  Do your laundry/housekeeping?: Yes  Manage your money, pay your bills and track your expenses?: Yes  Make your own meals?: Yes    Do your own shopping?: Yes    Previous Hospitalizations:   Any hospitalizations or ED visits within the last 12 months?: No      Advance Care Planning:   Living will: No    Durable POA for healthcare:  Yes    Advanced directive: No    Advanced directive counseling given: Yes    Five wishes given: Yes      Cognitive Screening:   Provider or family/friend/caregiver concerned regarding cognition?: No    PREVENTIVE SCREENINGS      Cardiovascular Screening:    General: Screening Not Indicated and History Lipid Disorder      Diabetes Screening:     General: Screening Current      Cervical Cancer Screening:    General: Screening Not Indicated      Osteoporosis Screening:    General: Risks and Benefits Discussed      Lung Cancer Screening:     General: Screening Not Indicated      Hepatitis C Screening:    General: Screening Current    Screening, Brief Intervention, and Referral to Treatment (SBIRT)    Screening  Typical number of drinks in a day: 0  Typical number of drinks in a week: 0  Interpretation: Low risk drinking behavior. AUDIT-C Screenin) How often did you have a drink containing alcohol in the past year? never  2) How many drinks did you have on a typical day when you were drinking in the past year? 0  3) How often did you have 6 or more drinks on one occasion in the past year? never    AUDIT-C Score: 0  Interpretation: Score 0-2 (female): Negative screen for alcohol misuse    Single Item Drug Screening:  How often have you used an illegal drug (including marijuana) or a prescription medication for non-medical reasons in the past year? never    Single Item Drug Screen Score: 0  Interpretation: Negative screen for possible drug use disorder    Time Spent  Time spent screening/evaluating the patient for alcohol misuse: 0 minutes. Time spent providing alcohol/substance abuse assessment and intervention services: 0 minutes. Annual Depression Screening  Time spent screening and evaluating the patient for depression during today's encounter was 5 minutes. Other Counseling Topics:   Car/seat belt/driving safety, skin self-exam, sunscreen and calcium and vitamin D intake and regular weightbearing exercise. No results found.      Physical Exam:     /78 (BP Location: Right arm, Patient Position: Sitting, Cuff Size: Adult)   Pulse 69   Temp 98.1 °F (36.7 °C)   Ht 5' 7" (1.702 m)   Wt 69.4 kg (153 lb)   SpO2 99%   BMI 23.96 kg/m² Physical Exam  Vitals and nursing note reviewed. Constitutional:       Appearance: Normal appearance. Comments: BMI 23.96    HENT:      Head: Atraumatic. Nose: No congestion or rhinorrhea. Eyes:      Extraocular Movements: Extraocular movements intact. Cardiovascular:      Rate and Rhythm: Normal rate and regular rhythm. Pulses: Normal pulses. Heart sounds: Normal heart sounds. Pulmonary:      Effort: Pulmonary effort is normal.   Abdominal:      General: Abdomen is flat. Musculoskeletal:      Right lower leg: No edema. Left lower leg: No edema. Skin:     Capillary Refill: Capillary refill takes less than 2 seconds. Neurological:      Mental Status: She is alert and oriented to person, place, and time. Psychiatric:         Mood and Affect: Mood normal.         Behavior: Behavior normal.          VAS CAROTID DUPLEX BILATERAL COMPLETE  Order: [id number]  Narrative    This result has an attachment that is not available. Date: 8/28/2023     Bilateral Carotid Artery Duplex examination (50601)     Indication: Bilateral Carotid Artery Stenosis (I 65.23)     High-resolution bilateral carotid artery duplex imaging was provided.     This study was technically adequate and appropriate for interpretation.     Prior study was compared when available. Right carotid: Peak systolic velocity of the internal carotid artery was   142 cm/s, with a peak diastolic velocity of 13 cm/s.  Peak systolic   velocity of the external carotid artery was 122 cm/s.  Peak systolic   velocity of the common carotid artery was 50 cm/sec. The ICA/CCA ratio was   2.8. B-mode imaging revealed mild heterogeneous plaque at the bifurcation. Left carotid: Peak systolic velocity of the internal carotid artery was   178 cm/s, with a peak diastolic velocity of 27 cm/s.  Peak systolic   velocity of the external carotid artery was 106 cm/s.  Peak systolic   velocity of the common carotid artery was 70 cm/sec. The ICA/CCA ratio was   2.5. B-mode imaging revealed mild heterogeneous plaque at the bifurcation. Right vertebral artery flow was antegrade.  Left vertebral artery flow was   antegrade. Impression:     1.  Less than 50% stenosis of the Right internal carotid artery based on   velocity and imaging criteria     2.  50-69% stenosis of the Left internal carotid artery based on velocity   and imaging criteria     3.  Normally directed vertebral flow bilaterally         Duplex Criteria for Internal Carotid Stenosis   (Degree: ICA PSV in cm/sec: Plaque: ICA/CCA ratio: ICA EDV in cm/sec)     Normal: <180: None: <2.0, <40       Contains abnormal data CBC and differential  Order: [id number]   Status: Final result      Visible to patient: Yes (not seen)      Next appt: None      Dx: Mixed hyperlipidemia;  Rash; Essential...      3 Result Notes       Component Ref Range & Units 1/25/23 10:20 AM   WBC 4.31 - 10.16 Thousand/uL 4.56    RBC 3.81 - 5.12 Million/uL 3.83    Hemoglobin 11.5 - 15.4 g/dL 12.2    Hematocrit 34.8 - 46.1 % 37.4    MCV 82 - 98 fL 98    MCH 26.8 - 34.3 pg 31.9    MCHC 31.4 - 37.4 g/dL 32.6    RDW 11.6 - 15.1 % 12.5    MPV 8.9 - 12.7 fL 10.6    Platelets 829 - 447 Thousands/uL 187    nRBC /100 WBCs 0    Neutrophils Relative 43 - 75 % 57    Immat GRANS % 0 - 2 % 0    Lymphocytes Relative 14 - 44 % 18    Monocytes Relative 4 - 12 % 10    Eosinophils Relative 0 - 6 % 14 High     Basophils Relative 0 - 1 % 1    Neutrophils Absolute 1.85 - 7.62 Thousands/µL 2.61    Immature Grans Absolute 0.00 - 0.20 Thousand/uL 0.01    Lymphocytes Absolute 0.60 - 4.47 Thousands/µL 0.81    Monocytes Absolute 0.17 - 1.22 Thousand/µL 0.47    Eosinophils Absolute 0.00 - 0.61 Thousand/µL 0.62 High     Basophils Absolute 0.00 - 0.10 Thousands/µL 0.04               Specimen Collected: 01/25/23 10:20 AM Last Resulted: 01/25/23  3:19 PM           Contains abnormal data Comprehensive metabolic panel  Order: [id number]   Status: Final result      Visible to patient: Yes (not seen)      Next appt: None      Dx: Mixed hyperlipidemia; Rash; Essential...      3 Result Notes            Component Ref Range & Units 1/25/23 10:20 AM 9/12/22  7:28 AM 11/24/21  7:15 AM 4/5/21  7:37 AM 12/14/20 11:59 AM 9/9/20  7:12 AM   Sodium 135 - 147 mmol/L 125 Low   131 Low  R  131 Low  R  129 Low  R  127 Low  R  128 Low  R    Potassium 3.5 - 5.3 mmol/L 4.0  4.4 R  4.5 R  4.6 R  5.0 R  4.3 R    Chloride 96 - 108 mmol/L 92 Low   95 Low  R  93 Low  R  92 Low  R  93 Low  R  91 Low  R    CO2 21 - 32 mmol/L 28  26 R  26 R  25 R  23 R  27 R    ANION GAP 4 - 13 mmol/L 5         BUN 5 - 25 mg/dL 11  8 R  7 Low  R  8 R  12 R  11 R    Creatinine 0.60 - 1.30 mg/dL 0.75  0.75 R  0.79 R  0.75 R  0.84 R  0.83 R    Comment: Standardized to IDMS reference method   Glucose, Fasting 65 - 99 mg/dL 88         Comment: Specimen collection should occur prior to Sulfasalazine administration due to the potential for falsely depressed results. Specimen collection should occur prior to Sulfapyridine administration due to the potential for falsely elevated results. Calcium 8.3 - 10.1 mg/dL 8.5         AST 5 - 45 U/L 32  26 R  25 R  27 R  39 R  30 R    Comment: Specimen collection should occur prior to Sulfasalazine administration due to the potential for falsely depressed results. ALT 12 - 78 U/L 35  21 R  20 R  27 R  38 High  R  25 R    Comment: Specimen collection should occur prior to Sulfasalazine and/or Sulfapyridine administration due to the potential for falsely depressed results.     Alkaline Phosphatase 46 - 116 U/L 100         Total Protein 6.4 - 8.4 g/dL 7.1  6.9 R  6.8 R  6.8 R  6.7 R  6.9 R    Albumin 3.5 - 5.0 g/dL 3.5  4.3 R  4.0 R  4.0 R  4.2 R  4.1 R    Total Bilirubin 0.20 - 1.00 mg/dL 0.72  0.6 R  0.6 R  0.5 R  0.6 R  0.5 R    Comment: Use of this assay is not recommended for patients undergoing treatment with eltrombopag due to the potential for falsely elevated results. eGFR ml/min/1.73sq m 75  80 R             Contains abnormal data C-reactive protein  Order: [id number]   Status: Final result      Visible to patient: Yes (not seen)      Next appt: None      Dx: Mixed hyperlipidemia; Rash; Essential...      3 Result Notes       Component Ref Range & Units 1/25/23 10:20 AM   CRP <3.0 mg/L 4.0 High                Specimen Collected: 01/25/23 10:20 AM Last Resulted: 01/25/23  3:41 PM           Sedimentation rate, automated  Order: [id number]   Status: Final result      Visible to patient: Yes (not seen)      Next appt: None      Dx: Mixed hyperlipidemia;  Rash; Essential...      3 Result Notes       Component Ref Range & Units 1/25/23 10:20 AM   Sed Rate 0 - 29 mm/hour 19               Specimen Collected: 01/25/23 10:20 AM Last Resulted: 01/25/23  3:50 PM             Steven Sims

## 2023-10-31 ENCOUNTER — TELEPHONE (OUTPATIENT)
Dept: FAMILY MEDICINE CLINIC | Facility: CLINIC | Age: 83
End: 2023-10-31

## 2023-10-31 NOTE — TELEPHONE ENCOUNTER
Patient takes ASA and a blood thinner. She recently has cut her finger two times (cutting her husbands hair and cutting roses) and one time she bled for 1.5 hours. She wants to know how she can stop the blood if/when this happens again.

## 2023-11-06 ENCOUNTER — TELEPHONE (OUTPATIENT)
Dept: FAMILY MEDICINE CLINIC | Facility: CLINIC | Age: 83
End: 2023-11-06

## 2023-12-13 DIAGNOSIS — J30.2 SEASONAL ALLERGIES: ICD-10-CM

## 2023-12-13 RX ORDER — MONTELUKAST SODIUM 10 MG/1
10 TABLET ORAL
Qty: 90 TABLET | Refills: 1 | Status: SHIPPED | OUTPATIENT
Start: 2023-12-13

## 2024-03-11 ENCOUNTER — RA CDI HCC (OUTPATIENT)
Dept: OTHER | Facility: HOSPITAL | Age: 84
End: 2024-03-11

## 2024-03-11 NOTE — PROGRESS NOTES
I70.213  HCC coding opportunities          Chart Reviewed number of suggestions sent to Provider: 1     Patients Insurance     Medicare Insurance: [employer]age

## 2024-03-12 DIAGNOSIS — D73.5 SPLENIC INFARCTION: ICD-10-CM

## 2024-03-12 RX ORDER — ASPIRIN 81 MG/1
81 TABLET, COATED ORAL DAILY
Qty: 90 TABLET | Refills: 1 | Status: SHIPPED | OUTPATIENT
Start: 2024-03-12 | End: 2024-03-19 | Stop reason: SDUPTHER

## 2024-03-18 ENCOUNTER — TELEPHONE (OUTPATIENT)
Dept: FAMILY MEDICINE CLINIC | Facility: CLINIC | Age: 84
End: 2024-03-18

## 2024-03-18 ENCOUNTER — OFFICE VISIT (OUTPATIENT)
Dept: FAMILY MEDICINE CLINIC | Facility: CLINIC | Age: 84
End: 2024-03-18
Payer: COMMERCIAL

## 2024-03-18 VITALS
DIASTOLIC BLOOD PRESSURE: 88 MMHG | HEIGHT: 67 IN | HEART RATE: 78 BPM | BODY MASS INDEX: 23.86 KG/M2 | WEIGHT: 152 LBS | SYSTOLIC BLOOD PRESSURE: 164 MMHG | TEMPERATURE: 97.6 F | OXYGEN SATURATION: 92 %

## 2024-03-18 DIAGNOSIS — J38.00 VOCAL CORD PARESIS: ICD-10-CM

## 2024-03-18 DIAGNOSIS — R60.0 EDEMA OF LOWER EXTREMITY: ICD-10-CM

## 2024-03-18 DIAGNOSIS — I10 ESSENTIAL HYPERTENSION: Primary | ICD-10-CM

## 2024-03-18 DIAGNOSIS — I74.10 SHAGGY AORTA SYNDROME (HCC): ICD-10-CM

## 2024-03-18 DIAGNOSIS — I10 WHITE COAT SYNDROME WITH HYPERTENSION: ICD-10-CM

## 2024-03-18 DIAGNOSIS — D73.5 SPLENIC INFARCTION: ICD-10-CM

## 2024-03-18 DIAGNOSIS — R73.01 ELEVATED FASTING BLOOD SUGAR: ICD-10-CM

## 2024-03-18 DIAGNOSIS — M35.09 SJOGREN'S SYNDROME WITH OTHER ORGAN INVOLVEMENT (HCC): ICD-10-CM

## 2024-03-18 DIAGNOSIS — E78.2 MIXED HYPERLIPIDEMIA: ICD-10-CM

## 2024-03-18 DIAGNOSIS — J30.2 SEASONAL ALLERGIES: ICD-10-CM

## 2024-03-18 DIAGNOSIS — E87.1 HYPONATREMIA: ICD-10-CM

## 2024-03-18 PROCEDURE — 99214 OFFICE O/P EST MOD 30 MIN: CPT | Performed by: NURSE PRACTITIONER

## 2024-03-18 PROCEDURE — G2211 COMPLEX E/M VISIT ADD ON: HCPCS | Performed by: NURSE PRACTITIONER

## 2024-03-18 RX ORDER — LABETALOL 200 MG/1
200 TABLET, FILM COATED ORAL EVERY 8 HOURS
COMMUNITY
Start: 2024-01-03 | End: 2024-03-19 | Stop reason: SDUPTHER

## 2024-03-18 NOTE — TELEPHONE ENCOUNTER
Patient calling to advise of who she sees for Derm    Dr Hickey in [place].  Phone [phone]  Fax [phone]    Also states she forgot to mention in her appointment today she needs all hers meds refilled , especially flonase

## 2024-03-18 NOTE — PROGRESS NOTES
Assessment/Plan:    Pt is a 83 yr old female   Presents in office for 6 month follow up   HTN - elevated BP in office however she does have white coat hypertension - states she has good blood pressures at home   She does have a log with her reviewed   Noted Cardiology appt recommendations   AMLODIPINE  5 mg CHORTHALIDONE 25 mg were added by cardiology - she never took them - she tried the amlodipine at night  and doesn't  tolerate well   Nervous to start chorthalidone and I agree with not being on it as she has electrolyte issues with diuretics- as per nephrology  no need    Blood pressures at home 120s-130s systolic .70s-80s  Log scanned in chart   She has not been taking them - Discussed with nephrology . I am worried that If she does take them it will affects her Na and drop her Bps at home   Nephrology is in agreement and according to nephrology she should be on amlodipine at night and explained that she can not tolerate.   She is to continue logs and follow up nephrology as discussed      Problem List Items Addressed This Visit       Essential hypertension - Primary     White coat   Discussed with Nephrology Dr Figueroa   Continue as is   Losartan 100 mg daily  Labetolol 200 mg every 8 hours   According to nephrology he also had her on Amlodipine 5 mg but she has not been able tolerate  so  longer on it   Cardiology had added Chlorthalidone but NOT on  due to tolerance and electrolyte issues we will continue to hold - discontinue both amlodipine and Chlorthalidone - Nephrology Dr Heard aware and in agreement   BP log at home stable as is no further interventions            Relevant Medications    Omega-3 Fatty Acids (fish oil) 1,000 mg    losartan (COZAAR) 100 MG tablet    labetalol (NORMODYNE) 200 mg tablet    atorvastatin (LIPITOR) 80 mg tablet    Other Relevant Orders    Comprehensive metabolic panel    CBC and differential    TSH, 3rd generation with Free T4 reflex    Lipid panel    UA/M w/rflx Culture,  Routine    Hemoglobin A1C    Hyponatremia    Mixed hyperlipidemia    Relevant Medications    atorvastatin (LIPITOR) 80 mg tablet    Other Relevant Orders    Comprehensive metabolic panel    CBC and differential    TSH, 3rd generation with Free T4 reflex    Lipid panel    UA/M w/rflx Culture, Routine    Hemoglobin A1C    Vocal fold paresis - left, severe     Stable   Denies any worsening symptoms denies any swallowing issues          Shaggy aorta syndrome (HCC)    Relevant Medications    atorvastatin (LIPITOR) 80 mg tablet    Edema of lower extremity     NO edema - no diuretics currently she has been stable          Relevant Orders    Comprehensive metabolic panel    CBC and differential    TSH, 3rd generation with Free T4 reflex    Lipid panel    UA/M w/rflx Culture, Routine    Hemoglobin A1C    Sjogren's syndrome with other organ involvement (HCC)     Other Visit Diagnoses       Elevated fasting blood sugar        Relevant Orders    Hemoglobin A1C    Seasonal allergies        Relevant Medications    olopatadine HCl (PATADAY) 0.2 % opth drops    montelukast (SINGULAIR) 10 mg tablet    fluticasone (FLONASE) 50 mcg/act nasal spray    ascorbic acid (VITAMIN C) 1000 MG tablet    Splenic infarction        Relevant Medications    clopidogrel (PLAVIX) 75 mg tablet    Aspirin Low Dose 81 MG EC tablet    White coat syndrome with hypertension        Relevant Medications    losartan (COZAAR) 100 MG tablet    labetalol (NORMODYNE) 200 mg tablet              Subjective:      Patient ID: Julia Rileybt is a 83 y.o. female.    Pt is a 83 yr old female   Presents in office for 6 month follow up   HTN - elevated BP in office however she does have white coat hypertension - states she has good blood pressures at home   She does have a log with her reviewed   Follow up multiple diagnoses and lab review           The following portions of the patient's history were reviewed and updated as appropriate:   Past Medical History:  She has a  past medical history of Allergic rhinitis, Anxiety disorder, Arthritis, Dizziness, Dry eye syndrome, Essential hypertension, malignant, Pure hypercholesterolemia, and Splenic infarction.,  _______________________________________________________________________  Medical Problems:  does not have any pertinent problems on file.,  _______________________________________________________________________  Past Surgical History:   has a past surgical history that includes Sinus surgery; Cardiac surgery (2019); Throat surgery (2000); Eye surgery; and Cosmetic surgery.,  _______________________________________________________________________  Family History:  family history includes Cancer in her maternal aunt; Heart disease in her mother; Other in her maternal grandfather and sister.,  _______________________________________________________________________  Social History:   reports that she quit smoking about 7 years ago. Her smoking use included cigarettes. She started smoking about 32 years ago. She has a 12.5 pack-year smoking history. She has never used smokeless tobacco. She reports current alcohol use. She reports that she does not use drugs.,  _______________________________________________________________________  Allergies:  is allergic to potassium chloride, xanax [alprazolam], and latex..  _______________________________________________________________________  Current Outpatient Medications   Medication Sig Dispense Refill    ascorbic acid (VITAMIN C) 1000 MG tablet Take 1 tablet (1,000 mg total) by mouth daily 90 tablet 0    Aspirin Low Dose 81 MG EC tablet Take 1 tablet (81 mg total) by mouth daily 90 tablet 1    atorvastatin (LIPITOR) 80 mg tablet Take 1 tablet (80 mg total) by mouth daily 90 tablet 0    B Complex Vitamins (B COMPLEX 1 PO) Take 2 tablets by mouth daily Each tablet is 40mg- Patient is taking liquid form.      clobetasol (TEMOVATE) 0.05 % external solution Apply topically 2 (two) times a day To  scalp for psoriasis 150 mL 1    clopidogrel (PLAVIX) 75 mg tablet Take 1 tablet (75 mg total) by mouth daily 90 tablet 0    cycloSPORINE (RESTASIS) 0.05 % ophthalmic emulsion Apply 1 drop to eye 2 (two) times a day      fluticasone (FLONASE) 50 mcg/act nasal spray 2 sprays into each nostril daily Spray 1 spray into each nostril every day 60 mL 1    labetalol (NORMODYNE) 200 mg tablet Take 1 tablet (200 mg total) by mouth every 8 (eight) hours 270 tablet 0    losartan (COZAAR) 100 MG tablet Take 1 tablet (100 mg total) by mouth daily 90 tablet 0    montelukast (SINGULAIR) 10 mg tablet Take 1 tablet (10 mg total) by mouth daily at bedtime 90 tablet 1    multivitamin (THERAGRAN) TABS Take 1 tablet by mouth daily      olopatadine HCl (PATADAY) 0.2 % opth drops Administer 1 drop to both eyes 2 (two) times a day 60 drop 0    Omega-3 Fatty Acids (fish oil) 1,000 mg Take 2 capsules (2,000 mg total) by mouth 2 (two) times a day 360 capsule 0    White Petrolatum-Mineral Oil (artificial tears) Administer 1 application to both eyes as needed for dry eyes 1 drop in each eye       No current facility-administered medications for this visit.     _______________________________________________________________________  Review of Systems   Constitutional:  Negative for chills, fatigue, fever and unexpected weight change.   HENT:  Positive for trouble swallowing. Negative for congestion, dental problem, postnasal drip, sinus pressure and sore throat.         Swallowing issues   Does have some some vocal paralysis    Eyes: Negative.  Negative for pain, discharge and itching.   Respiratory:  Negative for cough and shortness of breath.    Cardiovascular:  Negative for chest pain and leg swelling.        HTN - white coat syndrome elevated in office well controlled at home log has been scanned in chart   hyperlipidemia - stable    in the past - continues to see Vascular    Gastrointestinal:  Negative for abdominal distention, abdominal  "pain, nausea and vomiting.   Endocrine: Positive for cold intolerance.   Genitourinary:  Negative for difficulty urinating, dysuria and flank pain.        History of Na being low under care of Nephrology    Musculoskeletal:  Negative for back pain (upper back pain ) and myalgias.   Skin:  Negative for rash (still has some scalp erythema but much better ).   Allergic/Immunologic: Positive for environmental allergies.   Neurological:  Negative for dizziness, weakness and headaches.   Hematological:  Negative for adenopathy.   Psychiatric/Behavioral:  Negative for sleep disturbance and suicidal ideas. The patient is not nervous/anxious.          Objective:  Vitals:    03/18/24 1049   BP: 164/88   BP Location: Left arm   Patient Position: Sitting   Cuff Size: Adult   Pulse: 78   Temp: 97.6 °F (36.4 °C)   SpO2: 92%   Weight: 68.9 kg (152 lb)   Height: 5' 7\" (1.702 m)     Body mass index is 23.81 kg/m².     Physical Exam  Vitals and nursing note reviewed.   Constitutional:       Appearance: Normal appearance.      Comments: BMI 23.81   HENT:      Head: Atraumatic.   Eyes:      Extraocular Movements: Extraocular movements intact.   Cardiovascular:      Rate and Rhythm: Normal rate and regular rhythm.      Pulses: Normal pulses.      Heart sounds: Normal heart sounds.   Pulmonary:      Effort: Pulmonary effort is normal.      Breath sounds: Normal breath sounds.   Abdominal:      Palpations: Abdomen is soft.   Musculoskeletal:      Cervical back: Normal range of motion.      Right lower leg: No edema.      Left lower leg: No edema.   Skin:     Capillary Refill: Capillary refill takes less than 2 seconds.   Neurological:      Mental Status: She is alert and oriented to person, place, and time.   Psychiatric:         Mood and Affect: Mood normal.         Behavior: Behavior normal.       LABS REVIEWED AND SCANNED IN CHART   QUESTIONS ANSWERED   FOLLOW UP NEPHROLOGY VASCULAR AND CARDIOLOGY as planned   "

## 2024-03-19 RX ORDER — LABETALOL 200 MG/1
200 TABLET, FILM COATED ORAL EVERY 8 HOURS
Qty: 270 TABLET | Refills: 0 | Status: SHIPPED | OUTPATIENT
Start: 2024-03-19 | End: 2024-06-17

## 2024-03-19 RX ORDER — OLOPATADINE HYDROCHLORIDE 2 MG/ML
1 SOLUTION/ DROPS OPHTHALMIC 2 TIMES DAILY
Qty: 60 DROP | Refills: 0 | Status: SHIPPED | OUTPATIENT
Start: 2024-03-19 | End: 2024-03-26 | Stop reason: SDUPTHER

## 2024-03-19 RX ORDER — ASPIRIN 81 MG/1
81 TABLET, COATED ORAL DAILY
Qty: 90 TABLET | Refills: 1 | Status: SHIPPED | OUTPATIENT
Start: 2024-03-19

## 2024-03-19 RX ORDER — AMLODIPINE BESYLATE 5 MG/1
5 TABLET ORAL DAILY
COMMUNITY
Start: 2023-06-15 | End: 2024-03-19

## 2024-03-19 RX ORDER — LOSARTAN POTASSIUM 100 MG/1
100 TABLET ORAL DAILY
Qty: 90 TABLET | Refills: 0 | Status: SHIPPED | OUTPATIENT
Start: 2024-03-19 | End: 2024-06-17

## 2024-03-19 RX ORDER — FLUTICASONE PROPIONATE 50 MCG
2 SPRAY, SUSPENSION (ML) NASAL DAILY
Qty: 60 ML | Refills: 1 | Status: SHIPPED | OUTPATIENT
Start: 2024-03-19 | End: 2024-06-17

## 2024-03-19 RX ORDER — MONTELUKAST SODIUM 10 MG/1
10 TABLET ORAL
Qty: 90 TABLET | Refills: 1 | Status: SHIPPED | OUTPATIENT
Start: 2024-03-19

## 2024-03-19 RX ORDER — ATORVASTATIN CALCIUM 80 MG/1
80 TABLET, FILM COATED ORAL DAILY
Qty: 90 TABLET | Refills: 0 | Status: SHIPPED | OUTPATIENT
Start: 2024-03-19 | End: 2024-06-17

## 2024-03-19 RX ORDER — CLOPIDOGREL BISULFATE 75 MG/1
75 TABLET ORAL DAILY
Qty: 90 TABLET | Refills: 0 | Status: SHIPPED | OUTPATIENT
Start: 2024-03-19 | End: 2024-06-17

## 2024-03-19 RX ORDER — CHLORAL HYDRATE 500 MG
2000 CAPSULE ORAL 2 TIMES DAILY
Qty: 360 CAPSULE | Refills: 0 | Status: SHIPPED | OUTPATIENT
Start: 2024-03-19 | End: 2024-06-17

## 2024-03-19 NOTE — ASSESSMENT & PLAN NOTE
White coat   Discussed with Nephrology Dr Figueroa   Continue as is   Losartan 100 mg daily  Labetolol 200 mg every 8 hours   According to nephrology he also had her on Amlodipine 5 mg but she has not been able tolerate  so  longer on it   Cardiology had added Chlorthalidone but NOT on  due to tolerance and electrolyte issues we will continue to hold - discontinue both amlodipine and Chlorthalidone - Nephrology Dr Heard aware and in agreement   BP log at home stable as is no further interventions

## 2024-03-26 DIAGNOSIS — J30.2 SEASONAL ALLERGIES: ICD-10-CM

## 2024-03-26 RX ORDER — OLOPATADINE HYDROCHLORIDE 2 MG/ML
1 SOLUTION/ DROPS OPHTHALMIC 2 TIMES DAILY
Qty: 60 DROP | Refills: 0 | Status: SHIPPED | OUTPATIENT
Start: 2024-03-26 | End: 2024-04-25

## 2024-04-17 DIAGNOSIS — I74.10 SHAGGY AORTA SYNDROME (HCC): ICD-10-CM

## 2024-04-17 DIAGNOSIS — I10 ESSENTIAL HYPERTENSION: ICD-10-CM

## 2024-04-17 DIAGNOSIS — L40.9 PSORIASIS OF SCALP: ICD-10-CM

## 2024-04-17 DIAGNOSIS — E78.2 MIXED HYPERLIPIDEMIA: ICD-10-CM

## 2024-04-17 RX ORDER — LABETALOL 200 MG/1
200 TABLET, FILM COATED ORAL EVERY 8 HOURS
Qty: 270 TABLET | Refills: 0 | Status: SHIPPED | OUTPATIENT
Start: 2024-04-17

## 2024-04-17 RX ORDER — CLOBETASOL PROPIONATE 0.46 MG/ML
SOLUTION TOPICAL 2 TIMES DAILY
Qty: 150 ML | Refills: 1 | Status: SHIPPED | OUTPATIENT
Start: 2024-04-17

## 2024-04-17 RX ORDER — ATORVASTATIN CALCIUM 80 MG/1
80 TABLET, FILM COATED ORAL DAILY
Qty: 90 TABLET | Refills: 0 | Status: SHIPPED | OUTPATIENT
Start: 2024-04-17

## 2024-04-17 NOTE — TELEPHONE ENCOUNTER
Patient is asking for refill on Clobetasol for her psoriasis.     [facility]   [FreeTextEntry1] : Ms. Raman, presents today after being evaluated on March 13, 2021 after a fall, found on CTH to have a small SDH in the falx region. Today she denies headaches, visual disturbance, N/V, paresthesia, and weakness. Has chronic balance issue, ambulates with a cane.

## 2024-05-29 DIAGNOSIS — J30.2 SEASONAL ALLERGIES: ICD-10-CM

## 2024-05-29 RX ORDER — FLUTICASONE PROPIONATE 50 MCG
2 SPRAY, SUSPENSION (ML) NASAL DAILY
Qty: 60 ML | Refills: 1 | Status: SHIPPED | OUTPATIENT
Start: 2024-05-29 | End: 2024-08-27

## 2024-07-06 DIAGNOSIS — I10 ESSENTIAL HYPERTENSION: ICD-10-CM

## 2024-07-08 RX ORDER — LOSARTAN POTASSIUM 100 MG/1
100 TABLET ORAL DAILY
Qty: 90 TABLET | Refills: 0 | Status: SHIPPED | OUTPATIENT
Start: 2024-07-08

## 2024-07-17 DIAGNOSIS — I10 ESSENTIAL HYPERTENSION: ICD-10-CM

## 2024-07-17 RX ORDER — LABETALOL 200 MG/1
200 TABLET, FILM COATED ORAL EVERY 8 HOURS
Qty: 270 TABLET | Refills: 0 | Status: SHIPPED | OUTPATIENT
Start: 2024-07-17

## 2024-08-04 DIAGNOSIS — D73.5 SPLENIC INFARCTION: ICD-10-CM

## 2024-08-05 RX ORDER — CLOPIDOGREL BISULFATE 75 MG/1
75 TABLET ORAL DAILY
Qty: 90 TABLET | Refills: 0 | Status: SHIPPED | OUTPATIENT
Start: 2024-08-05

## 2024-08-10 DIAGNOSIS — J30.2 SEASONAL ALLERGIES: ICD-10-CM

## 2024-08-12 RX ORDER — MONTELUKAST SODIUM 10 MG/1
10 TABLET ORAL
Qty: 90 TABLET | Refills: 1 | Status: SHIPPED | OUTPATIENT
Start: 2024-08-12

## 2024-09-04 DIAGNOSIS — D73.5 SPLENIC INFARCTION: ICD-10-CM

## 2024-09-04 RX ORDER — CLOPIDOGREL BISULFATE 75 MG/1
75 TABLET ORAL DAILY
Qty: 90 TABLET | Refills: 0 | Status: SHIPPED | OUTPATIENT
Start: 2024-09-04

## 2024-09-20 DIAGNOSIS — I10 ESSENTIAL HYPERTENSION: ICD-10-CM

## 2024-09-20 DIAGNOSIS — I74.10 SHAGGY AORTA SYNDROME (HCC): ICD-10-CM

## 2024-09-20 DIAGNOSIS — E78.2 MIXED HYPERLIPIDEMIA: ICD-10-CM

## 2024-09-20 RX ORDER — ATORVASTATIN CALCIUM 80 MG/1
80 TABLET, FILM COATED ORAL DAILY
Qty: 90 TABLET | Refills: 0 | Status: SHIPPED | OUTPATIENT
Start: 2024-09-20

## 2024-09-24 ENCOUNTER — TELEPHONE (OUTPATIENT)
Dept: FAMILY MEDICINE CLINIC | Facility: CLINIC | Age: 84
End: 2024-09-24

## 2024-09-25 ENCOUNTER — RA CDI HCC (OUTPATIENT)
Dept: OTHER | Facility: HOSPITAL | Age: 84
End: 2024-09-25

## 2024-09-30 DIAGNOSIS — L40.9 PSORIASIS OF SCALP: ICD-10-CM

## 2024-09-30 RX ORDER — CLOBETASOL PROPIONATE 0.5 MG/ML
SOLUTION TOPICAL 2 TIMES DAILY
Qty: 150 ML | Refills: 1 | Status: SHIPPED | OUTPATIENT
Start: 2024-09-30

## 2024-10-21 ENCOUNTER — TELEPHONE (OUTPATIENT)
Dept: FAMILY MEDICINE CLINIC | Facility: CLINIC | Age: 84
End: 2024-10-21

## 2024-10-21 NOTE — TELEPHONE ENCOUNTER
Patient has follow-up appointment  Lab results done on 10/17/2024  Blood counts look good white blood cells 5.1 hemoglobin 12.4 hematocrit 36.7 platelets 216  Sodium 131 baseline low chloride 93 GFR 87 urine looks good cholesterol is stable total cholesterol 147 triglycerides 43 HDL 63 LDL 74 hemoglobin A1c 5.6 stable and TSH 2.7

## 2024-10-22 NOTE — TELEPHONE ENCOUNTER
Patient called to confirm her appointment and was having trouble viewing her lab results that were reviewed. Patient was read verbatim Verbrissa's interpretation. Patient does not have any further questions.

## 2024-10-25 ENCOUNTER — OFFICE VISIT (OUTPATIENT)
Dept: FAMILY MEDICINE CLINIC | Facility: CLINIC | Age: 84
End: 2024-10-25
Payer: COMMERCIAL

## 2024-10-25 VITALS
BODY MASS INDEX: 23.7 KG/M2 | DIASTOLIC BLOOD PRESSURE: 88 MMHG | SYSTOLIC BLOOD PRESSURE: 148 MMHG | OXYGEN SATURATION: 96 % | WEIGHT: 151 LBS | HEART RATE: 90 BPM | HEIGHT: 67 IN | TEMPERATURE: 97.6 F

## 2024-10-25 DIAGNOSIS — I10 ESSENTIAL HYPERTENSION: Primary | ICD-10-CM

## 2024-10-25 DIAGNOSIS — R09.81 NASAL CONGESTION: ICD-10-CM

## 2024-10-25 DIAGNOSIS — Z78.0 MENOPAUSE: ICD-10-CM

## 2024-10-25 DIAGNOSIS — E78.2 MIXED HYPERLIPIDEMIA: ICD-10-CM

## 2024-10-25 DIAGNOSIS — Z23 ENCOUNTER FOR IMMUNIZATION: ICD-10-CM

## 2024-10-25 DIAGNOSIS — Z00.00 MEDICARE ANNUAL WELLNESS VISIT, SUBSEQUENT: ICD-10-CM

## 2024-10-25 DIAGNOSIS — D73.5 SPLENIC INFARCTION: ICD-10-CM

## 2024-10-25 DIAGNOSIS — I74.10 SHAGGY AORTA SYNDROME (HCC): ICD-10-CM

## 2024-10-25 DIAGNOSIS — J30.2 SEASONAL ALLERGIES: ICD-10-CM

## 2024-10-25 PROCEDURE — 99214 OFFICE O/P EST MOD 30 MIN: CPT | Performed by: NURSE PRACTITIONER

## 2024-10-25 PROCEDURE — G0439 PPPS, SUBSEQ VISIT: HCPCS | Performed by: NURSE PRACTITIONER

## 2024-10-25 PROCEDURE — G0008 ADMIN INFLUENZA VIRUS VAC: HCPCS

## 2024-10-25 PROCEDURE — 90662 IIV NO PRSV INCREASED AG IM: CPT

## 2024-10-25 RX ORDER — ASPIRIN 81 MG/1
81 TABLET, COATED ORAL DAILY
Qty: 90 TABLET | Refills: 1 | Status: SHIPPED | OUTPATIENT
Start: 2024-10-25

## 2024-10-25 RX ORDER — LOSARTAN POTASSIUM 100 MG/1
100 TABLET ORAL DAILY
Qty: 90 TABLET | Refills: 0 | Status: SHIPPED | OUTPATIENT
Start: 2024-10-25

## 2024-10-25 RX ORDER — MONTELUKAST SODIUM 10 MG/1
10 TABLET ORAL
Qty: 90 TABLET | Refills: 1 | Status: SHIPPED | OUTPATIENT
Start: 2024-10-25

## 2024-10-25 RX ORDER — ATORVASTATIN CALCIUM 80 MG/1
80 TABLET, FILM COATED ORAL DAILY
Qty: 90 TABLET | Refills: 0 | Status: SHIPPED | OUTPATIENT
Start: 2024-10-25

## 2024-10-25 RX ORDER — FLUTICASONE PROPIONATE 50 MCG
2 SPRAY, SUSPENSION (ML) NASAL DAILY
Qty: 60 ML | Refills: 1 | Status: SHIPPED | OUTPATIENT
Start: 2024-10-25 | End: 2025-01-23

## 2024-10-25 RX ORDER — AMOXICILLIN 875 MG/1
875 TABLET, COATED ORAL 2 TIMES DAILY
Qty: 10 TABLET | Refills: 0 | Status: SHIPPED | OUTPATIENT
Start: 2024-10-25 | End: 2024-10-25

## 2024-10-25 RX ORDER — CLOPIDOGREL BISULFATE 75 MG/1
75 TABLET ORAL DAILY
Qty: 90 TABLET | Refills: 0 | Status: SHIPPED | OUTPATIENT
Start: 2024-10-25

## 2024-10-25 RX ORDER — LABETALOL 200 MG/1
200 TABLET, FILM COATED ORAL EVERY 8 HOURS
Qty: 270 TABLET | Refills: 0 | Status: SHIPPED | OUTPATIENT
Start: 2024-10-25 | End: 2025-01-23

## 2024-10-25 RX ORDER — CHLORAL HYDRATE 500 MG
2000 CAPSULE ORAL 2 TIMES DAILY
Qty: 360 CAPSULE | Refills: 0 | Status: SHIPPED | OUTPATIENT
Start: 2024-10-25 | End: 2025-01-23

## 2024-10-25 RX ORDER — AMOXICILLIN 250 MG/5ML
500 POWDER, FOR SUSPENSION ORAL 3 TIMES DAILY
Qty: 150 ML | Refills: 0 | Status: SHIPPED | OUTPATIENT
Start: 2024-10-25 | End: 2024-10-30

## 2024-10-25 RX ORDER — OLOPATADINE HYDROCHLORIDE 2 MG/ML
1 SOLUTION/ DROPS OPHTHALMIC 2 TIMES DAILY
Qty: 60 DROP | Refills: 0 | Status: SHIPPED | OUTPATIENT
Start: 2024-10-25 | End: 2024-11-24

## 2024-10-25 NOTE — ASSESSMENT & PLAN NOTE
Orders:    atorvastatin (LIPITOR) 80 mg tablet; Take 1 tablet (80 mg total) by mouth daily    Comprehensive metabolic panel; Future    CBC and differential; Future    TSH, 3rd generation with Free T4 reflex; Future    Lipid panel; Future    UA/M w/rflx Culture, Routine; Future

## 2024-10-25 NOTE — ASSESSMENT & PLAN NOTE
Orders:    atorvastatin (LIPITOR) 80 mg tablet; Take 1 tablet (80 mg total) by mouth daily     Alert and oriented to person, place and time/Patient baseline mental status/Awake

## 2024-10-25 NOTE — PROGRESS NOTES
Ambulatory Visit  Name: Julia Imbt      : 1940      MRN: [id number]  Encounter Provider: KRISTINA Holden  Encounter Date: 10/25/2024   Encounter department: [facility]    Pt is a 84 yr old female   Presents in office for 6 month follow up on multiple underlying issues   And due for medicare wellness   HTN - stable - she has had a lot of stress spouse has been sick and recently had surgery   Hyperlipidemia - much improved   CKD stable     Assessment & Plan  Essential hypertension    Orders:    atorvastatin (LIPITOR) 80 mg tablet; Take 1 tablet (80 mg total) by mouth daily    labetalol (NORMODYNE) 200 mg tablet; Take 1 tablet (200 mg total) by mouth every 8 (eight) hours    losartan (COZAAR) 100 MG tablet; Take 1 tablet (100 mg total) by mouth daily    Omega-3 Fatty Acids (fish oil) 1,000 mg; Take 2 capsules (2,000 mg total) by mouth 2 (two) times a day    Comprehensive metabolic panel; Future    CBC and differential; Future    TSH, 3rd generation with Free T4 reflex; Future    Lipid panel; Future    UA/M w/rflx Culture, Routine; Future    Mixed hyperlipidemia    Orders:    atorvastatin (LIPITOR) 80 mg tablet; Take 1 tablet (80 mg total) by mouth daily    Comprehensive metabolic panel; Future    CBC and differential; Future    TSH, 3rd generation with Free T4 reflex; Future    Lipid panel; Future    UA/M w/rflx Culture, Routine; Future    Splenic infarction    Orders:    clopidogrel (PLAVIX) 75 mg tablet; Take 1 tablet (75 mg total) by mouth daily    Aspirin Low Dose 81 MG EC tablet; Take 1 tablet (81 mg total) by mouth daily    Seasonal allergies    Orders:    montelukast (SINGULAIR) 10 mg tablet; Take 1 tablet (10 mg total) by mouth daily at bedtime    olopatadine HCl (PATADAY) 0.2 % opth drops; Administer 1 drop to both eyes 2 (two) times a day    fluticasone (FLONASE) 50 mcg/act nasal spray; 2 sprays into each nostril daily Spray 1 spray into each nostril every day     amoxicillin (Amoxil) 250 mg/5 mL oral suspension; Take 10 mL (500 mg total) by mouth 3 (three) times a day for 5 days    Shaggy aorta syndrome (HCC)    Orders:    atorvastatin (LIPITOR) 80 mg tablet; Take 1 tablet (80 mg total) by mouth daily    Encounter for immunization    Orders:    influenza vaccine, high-dose, PF 0.5 mL (Fluzone High Dose)    Nasal congestion    Orders:    Comprehensive metabolic panel; Future    CBC and differential; Future    TSH, 3rd generation with Free T4 reflex; Future    Lipid panel; Future    UA/M w/rflx Culture, Routine; Future    Medicare annual wellness visit, subsequent         Menopause    Orders:    DXA bone density spine hip and pelvis; Future      Depression Screening and Follow-up Plan: Patient was screened for depression during today's encounter. They screened negative with a PHQ-2 score of 0.    Falls Plan of Care: balance, strength, and gait training instructions were provided. Recommended assistive device to help with gait and balance. Medications that increase falls were reviewed. Assessed feet and footwear. Vitamin D supplementation was recommended. Home safety education provided.       Preventive health issues were discussed with patient, and age appropriate screening tests were ordered as noted in patient's After Visit Summary. Personalized health advice and appropriate referrals for health education or preventive services given if needed, as noted in patient's After Visit Summary.    History of Present Illness     Pt is a 84 yr old female   Presents in office for 6 month follow up on multiple underlying issues   And due for medicare wellness   HTN - stable - she has had a lot of stress spouse has been sick and recently had surgery   Hyperlipidemia - much improved   CKD stable            Patient Care Team:  KRISTINA Holden as PCP - General (Nurse Practitioner)    Review of Systems   Constitutional:  Negative for chills, fatigue, fever and unexpected weight change.    HENT:  Positive for trouble swallowing. Negative for congestion, dental problem, postnasal drip, sinus pressure and sore throat.         Swallowing issues   Does have some some vocal paralysis    Eyes: Negative.  Negative for pain, discharge and itching.   Respiratory:  Negative for cough and shortness of breath.    Cardiovascular:  Negative for chest pain and leg swelling.        HTN - white coat syndrome elevated in office well controlled at home log has been scanned in chart - has been stable   hyperlipidemia - stable    in the past - continues to see Vascular    Gastrointestinal:  Negative for abdominal distention, abdominal pain, nausea and vomiting.   Endocrine: Positive for cold intolerance.   Genitourinary:  Negative for difficulty urinating, dysuria and flank pain.        History of Na being low under care of Nephrology    Musculoskeletal:  Negative for back pain (upper back pain ), myalgias and neck stiffness.   Skin:  Negative for rash (still has some scalp erythema but much better ).   Allergic/Immunologic: Positive for environmental allergies.   Neurological:  Negative for dizziness, weakness and headaches.   Hematological:  Negative for adenopathy.   Psychiatric/Behavioral:  Negative for sleep disturbance and suicidal ideas. The patient is not nervous/anxious.      Medical History Reviewed by provider this encounter:  Tobacco  Allergies  Meds  Problems  Med Hx  Surg Hx  Fam Hx       Annual Wellness Visit Questionnaire   Julia is here for her Subsequent Wellness visit.     Health Risk Assessment:   Patient rates overall health as good. Patient feels that their physical health rating is same. Patient is satisfied with their life. Eyesight was rated as same. Hearing was rated as slightly worse. Patient feels that their emotional and mental health rating is same. Patients states they are sometimes angry. Patient states they are sometimes unusually tired/fatigued. Pain experienced in the last  7 days has been some. Patient's pain rating has been 7/10. Patient states that she has experienced no weight loss or gain in last 6 months.     Depression Screening:   PHQ-2 Score: 0      Fall Risk Screening:   In the past year, patient has experienced: no history of falling in past year      Urinary Incontinence Screening:   Patient has not leaked urine accidently in the last six months.     Home Safety:  Patient has trouble with stairs inside or outside of their home. Patient has working smoke alarms and has working carbon monoxide detector. Home safety hazards include: none.     Nutrition:   Current diet is Regular.     Medications:   Patient is currently taking over-the-counter supplements. OTC medications include: see medication list. Patient is able to manage medications.     Activities of Daily Living (ADLs)/Instrumental Activities of Daily Living (IADLs):   Walk and transfer into and out of bed and chair?: Yes  Dress and groom yourself?: Yes    Bathe or shower yourself?: Yes    Feed yourself? Yes  Do your laundry/housekeeping?: Yes  Manage your money, pay your bills and track your expenses?: Yes  Make your own meals?: Yes    Do your own shopping?: Yes    Previous Hospitalizations:   Any hospitalizations or ED visits within the last 12 months?: No      Advance Care Planning:   Living will: Yes    Advanced directive: Yes    Advanced directive counseling given: Yes    ACP document given: Yes      Cognitive Screening:   Provider or family/friend/caregiver concerned regarding cognition?: No    PREVENTIVE SCREENINGS      Cardiovascular Screening:    General: Screening Not Indicated and History Lipid Disorder      Colorectal Cancer Screening:     General: Risks and Benefits Discussed      Breast Cancer Screening:     General: Risks and Benefits Discussed    Due for: Mammogram        Cervical Cancer Screening:    General: Screening Not Indicated      Osteoporosis Screening:    General: Risks and Benefits  Discussed      Lung Cancer Screening:     General: Screening Not Indicated      Hepatitis C Screening:    General: Screening Current    Screening, Brief Intervention, and Referral to Treatment (SBIRT)    Screening  Typical number of drinks in a day: 0  Typical number of drinks in a week: 0  Interpretation: Low risk drinking behavior.    Single Item Drug Screening:  How often have you used an illegal drug (including marijuana) or a prescription medication for non-medical reasons in the past year? never    Single Item Drug Screen Score: 0  Interpretation: Negative screen for possible drug use disorder    Time Spent  Time spent screening/evaluating the patient for alcohol misuse: 0 minutes. Time spent providing alcohol/substance abuse assessment and intervention services: 0 minutes.    Annual Depression Screening  Time spent screening and evaluating the patient for depression during today's encounter was 5 minutes.    Other Counseling Topics:   Car/seat belt/driving safety, skin self-exam, sunscreen and calcium and vitamin D intake and regular weightbearing exercise.     Social Determinants of Health     Financial Resource Strain: Patient Declined (9/15/2023)    Overall Financial Resource Strain (CARDIA)     Difficulty of Paying Living Expenses: Patient declined   Food Insecurity: Patient Declined (10/25/2024)    Hunger Vital Sign     Worried About Running Out of Food in the Last Year: Patient declined     Ran Out of Food in the Last Year: Patient declined   Transportation Needs: Patient Declined (10/25/2024)    PRAPARE - Transportation     Lack of Transportation (Medical): Patient declined     Lack of Transportation (Non-Medical): Patient declined   Housing Stability: Patient Declined (10/25/2024)    Housing Stability Vital Sign     Unable to Pay for Housing in the Last Year: Patient declined     Number of Times Moved in the Last Year: 0     Homeless in the Last Year: Patient declined   Utilities: Patient Declined  "(10/25/2024)    [facility] Utilities     Threatened with loss of utilities: Patient declined     No results found.    Objective     /88 (BP Location: Left arm, Patient Position: Sitting, Cuff Size: Adult)   Pulse 90   Temp 97.6 °F (36.4 °C)   Ht 5' 7\" (1.702 m)   Wt 68.5 kg (151 lb)   SpO2 96%   BMI 23.65 kg/m²     Physical Exam  Vitals and nursing note reviewed.   Constitutional:       Appearance: Normal appearance.      Comments: BMI 23.65 stable    HENT:      Head: Atraumatic.      Nose: No congestion or rhinorrhea.   Eyes:      Extraocular Movements: Extraocular movements intact.   Cardiovascular:      Rate and Rhythm: Normal rate and regular rhythm.      Pulses: Normal pulses.      Heart sounds: Normal heart sounds.   Pulmonary:      Effort: Pulmonary effort is normal.   Abdominal:      General: Abdomen is flat.   Musculoskeletal:      Right lower leg: No edema.      Left lower leg: No edema.   Skin:     Capillary Refill: Capillary refill takes less than 2 seconds.   Neurological:      Mental Status: She is alert and oriented to person, place, and time.   Psychiatric:         Mood and Affect: Mood normal.         Behavior: Behavior normal.     Labs reviewed and stable   Scanned in media  Reviewed Vascular and nephrology follow up   All stable   Administrative Statements   I have spent a total time of 45  minutes in caring for this patient on the day of the visit/encounter including Diagnostic results, Prognosis, Risks and benefits of tx options, Instructions for management, Patient and family education, Importance of tx compliance, Risk factor reductions, Impressions, Counseling / Coordination of care, Documenting in the medical record, Reviewing / ordering tests, medicine, procedures  , Obtaining or reviewing history  , and Communicating with other healthcare professionals .  "

## 2024-10-25 NOTE — ASSESSMENT & PLAN NOTE
Orders:    atorvastatin (LIPITOR) 80 mg tablet; Take 1 tablet (80 mg total) by mouth daily    labetalol (NORMODYNE) 200 mg tablet; Take 1 tablet (200 mg total) by mouth every 8 (eight) hours    losartan (COZAAR) 100 MG tablet; Take 1 tablet (100 mg total) by mouth daily    Omega-3 Fatty Acids (fish oil) 1,000 mg; Take 2 capsules (2,000 mg total) by mouth 2 (two) times a day    Comprehensive metabolic panel; Future    CBC and differential; Future    TSH, 3rd generation with Free T4 reflex; Future    Lipid panel; Future    UA/M w/rflx Culture, Routine; Future

## 2024-11-20 ENCOUNTER — HOSPITAL ENCOUNTER (OUTPATIENT)
Age: 84
Discharge: HOME | End: 2024-11-20
Payer: COMMERCIAL

## 2024-11-20 VITALS — BODY MASS INDEX: 24.94 KG/M2 | HEIGHT: 65 IN

## 2024-11-20 DIAGNOSIS — Z78.0 MENOPAUSE: ICD-10-CM

## 2024-11-20 PROCEDURE — 77080 DXA BONE DENSITY AXIAL: CPT

## 2024-11-24 ENCOUNTER — RESULTS FOLLOW-UP (OUTPATIENT)
Dept: FAMILY MEDICINE CLINIC | Facility: CLINIC | Age: 84
End: 2024-11-24

## 2024-11-24 NOTE — RESULT ENCOUNTER NOTE
Would like to start her on Boniva for osteoporosis if she is ok ith it ? IMPRESSION:    1. Osteoporosis. Based on the total left hip    2.  The 10 year risk of hip fracture is 6.3% with the 10 year risk of major osteoporotic fracture being 18% as calculated by the [employer] fracture risk assessment tool (FRAX, which is based on data generated by the WHO Collaborating [place]   for Metabolic Bone Diseases).    3.  The current NOF guidelines recommend treating patients with a T-score of -2.5 or less in the lumbar spine or hips, or in post-menopausal women and men over the age of 50 with low bone mass (osteopenia) and a FRAX 10 year risk score of >3% for hip   fracture and/or >20% for major osteoporotic fracture.

## 2024-11-25 ENCOUNTER — TELEPHONE (OUTPATIENT)
Age: 84
End: 2024-11-25

## 2024-11-25 NOTE — TELEPHONE ENCOUNTER
Patient was given results and does not want to start Boniva. She would like to speak to PCP 1st  Thank you     KRISTINA Holden  11/24/2024  1:36 PM EST    Would like to start her on Boniva for osteoporosis if she is ok ith it ? IMPRESSION:     1. Osteoporosis. Based on the total left hip     2.  The 10 year risk of hip fracture is 6.3% with the 10 year risk of major osteoporotic fracture being 18% as calculated by the [employer] fracture risk assessment tool (FRAX, which is based on data generated by the WHO Collaborating [place]   for Metabolic Bone Diseases).     3.  The current NOF guidelines recommend treating patients with a T-score of -2.5 or less in the lumbar spine or hips, or in post-menopausal women and men over the age of 50 with low bone mass (osteopenia) and a FRAX 10 year risk score of >3% for hip  fracture and/or >20% for major osteoporotic fracture.

## 2024-11-26 NOTE — TELEPHONE ENCOUNTER
Advised Patient   She is going to look up the medication and side effects and discuss at next visit

## 2025-01-22 ENCOUNTER — TELEPHONE (OUTPATIENT)
Age: 85
End: 2025-01-22

## 2025-01-22 NOTE — TELEPHONE ENCOUNTER
Pt called stating she received an email on Monday stating new tests have been placed in her chart.  Unable to see anything besides a [facility] lab slip. Pt aware and is planning on following up with the blood work prior to her appointment in April 2025

## 2025-01-25 DIAGNOSIS — I10 ESSENTIAL HYPERTENSION: ICD-10-CM

## 2025-01-25 DIAGNOSIS — E78.2 MIXED HYPERLIPIDEMIA: ICD-10-CM

## 2025-01-25 DIAGNOSIS — I74.10 SHAGGY AORTA SYNDROME (HCC): ICD-10-CM

## 2025-01-27 RX ORDER — LOSARTAN POTASSIUM 100 MG/1
100 TABLET ORAL DAILY
Qty: 90 TABLET | Refills: 0 | Status: SHIPPED | OUTPATIENT
Start: 2025-01-27

## 2025-01-27 RX ORDER — ATORVASTATIN CALCIUM 80 MG/1
80 TABLET, FILM COATED ORAL DAILY
Qty: 90 TABLET | Refills: 0 | Status: SHIPPED | OUTPATIENT
Start: 2025-01-27

## 2025-01-27 RX ORDER — LABETALOL 200 MG/1
200 TABLET, FILM COATED ORAL EVERY 8 HOURS
Qty: 270 TABLET | Refills: 0 | Status: SHIPPED | OUTPATIENT
Start: 2025-01-27

## 2025-03-05 ENCOUNTER — TELEMEDICINE (OUTPATIENT)
Dept: FAMILY MEDICINE CLINIC | Facility: CLINIC | Age: 85
End: 2025-03-05
Payer: COMMERCIAL

## 2025-03-05 DIAGNOSIS — R09.81 NASAL CONGESTION: ICD-10-CM

## 2025-03-05 DIAGNOSIS — R50.9 FEVER, UNSPECIFIED FEVER CAUSE: Primary | ICD-10-CM

## 2025-03-05 DIAGNOSIS — R52 BODY ACHES: ICD-10-CM

## 2025-03-05 PROCEDURE — 99213 OFFICE O/P EST LOW 20 MIN: CPT | Performed by: NURSE PRACTITIONER

## 2025-03-05 PROCEDURE — G2211 COMPLEX E/M VISIT ADD ON: HCPCS | Performed by: NURSE PRACTITIONER

## 2025-03-05 RX ORDER — COVID-19 ANTIGEN TEST
3 KIT MISCELLANEOUS
Qty: 2 KIT | Refills: 0 | Status: SHIPPED | OUTPATIENT
Start: 2025-03-05 | End: 2025-03-11

## 2025-03-05 RX ORDER — AMOXICILLIN 250 MG/5ML
500 POWDER, FOR SUSPENSION ORAL 3 TIMES DAILY
Qty: 150 ML | Refills: 0 | Status: SHIPPED | OUTPATIENT
Start: 2025-03-05 | End: 2025-03-11 | Stop reason: SDUPTHER

## 2025-03-05 RX ORDER — ACETAMINOPHEN 160 MG/5ML
640 SUSPENSION ORAL EVERY 8 HOURS PRN
Qty: 600 ML | Refills: 0 | Status: SHIPPED | OUTPATIENT
Start: 2025-03-05 | End: 2025-03-15

## 2025-03-05 NOTE — PROGRESS NOTES
Virtual Regular VisitName: Julia Imbt      : 1940      MRN: [id number]  Encounter Provider: KRISTINA Holden  Encounter Date: 3/5/2025   Encounter department: [facility]  :  Assessment & Plan  Fever, unspecified fever cause  Chills and shakes no thermometer at home   Possibly febrile started with symptoms this morning.   Discussed symptoms management Tylenol and Motrin as needed for headaches or fevers   Needed nasal saline lavages for nasal congestion  Discussed over-the-counter modalities  Discussed watchful waiting and if not better within 3 to 4 days they are supposed to call me   If any excessive shortness of breath difficulty breathing please go to the ER   Orders:    amoxicillin (Amoxil) 250 mg/5 mL oral suspension; Take 10 mL (500 mg total) by mouth 3 (three) times a day for 5 days    acetaminophen (TYLENOL) 160 mg/5 mL suspension; Take 20 mL (640 mg total) by mouth every 8 (eight) hours as needed for mild pain or fever for up to 10 days    COVID-19 At Home Antigen Test KIT; Test 3 Units every 3 (three) days for 6 days    Body aches  Discussed tylenol use as needed for body aches and fevers   I will order COVID test to be deliver at home for home - she will need to check and let me know if positive   This could be also Flu.   Discussed viral pathophysiology   Treatment options   Reportable s/s and when to seek urgent care   Orders:    amoxicillin (Amoxil) 250 mg/5 mL oral suspension; Take 10 mL (500 mg total) by mouth 3 (three) times a day for 5 days    acetaminophen (TYLENOL) 160 mg/5 mL suspension; Take 20 mL (640 mg total) by mouth every 8 (eight) hours as needed for mild pain or fever for up to 10 days    COVID-19 At Home Antigen Test KIT; Test 3 Units every 3 (three) days for 6 days    Nasal congestion  Saline lavages as needed for sinus congestion or flonase   Take allergy medications hydrate well   Warm compressors over sinuses                 History of  Present Illness     Pt is a 84 yr old female   Presents via virtual visit   Sick   Started today this morning with body aches fevers chills and slight cough and nasal congestion   She does not have a thermometer         Review of Systems   Constitutional:  Positive for chills and fatigue. Negative for fever.   HENT:  Positive for congestion.    Respiratory:  Positive for cough.    Cardiovascular:  Negative for chest pain and palpitations.   Gastrointestinal:  Positive for nausea. Negative for abdominal distention and abdominal pain.   Musculoskeletal:  Positive for arthralgias and myalgias.   Skin:  Negative for rash.   Neurological:  Positive for headaches.   Psychiatric/Behavioral:  Negative for sleep disturbance and suicidal ideas. The patient is nervous/anxious.        Objective   There were no vitals taken for this visit.    Physical Exam  Constitutional:       Appearance: She is ill-appearing.   HENT:      Nose: Congestion present.   Neurological:      Mental Status: She is oriented to person, place, and time.         Administrative Statements   Encounter provider KRISTINA Holden    The Patient is located at Home and in the following state in which I hold an active license [place].    The patient was identified by name and date of birth. Julia Caruso was informed that this is a telemedicine visit and that the visit is being conducted through the Epic Embedded platform. She agrees to proceed..  My office door was closed. No one else was in the room.  She acknowledged consent and understanding of privacy and security of the video platform. The patient has agreed to participate and understands they can discontinue the visit at any time.    I have spent a total time of 25  minutes in caring for this patient on the day of the visit/encounter including Diagnostic results, Prognosis, Risks and benefits of tx options, Instructions for management, Patient and family education, Importance of tx compliance, Risk factor  reductions, Impressions, Counseling / Coordination of care, Documenting in the medical record, Reviewing/placing orders in the medical record (including tests, medications, and/or procedures), Obtaining or reviewing history  , and Communicating with other healthcare professionals , not including the time spent for establishing the audio/video connection.

## 2025-03-11 ENCOUNTER — TELEPHONE (OUTPATIENT)
Age: 85
End: 2025-03-11

## 2025-03-11 DIAGNOSIS — R50.9 FEVER, UNSPECIFIED FEVER CAUSE: ICD-10-CM

## 2025-03-11 DIAGNOSIS — R52 BODY ACHES: ICD-10-CM

## 2025-03-11 RX ORDER — AMOXICILLIN 250 MG/5ML
500 POWDER, FOR SUSPENSION ORAL 3 TIMES DAILY
Qty: 150 ML | Refills: 0 | Status: SHIPPED | OUTPATIENT
Start: 2025-03-11 | End: 2025-03-16

## 2025-03-11 NOTE — TELEPHONE ENCOUNTER
Patient called stating she still has nasal congestion and cough. She has completed her Amoxicillin as of yesterday from her 3/5 appointment but still feels she's experiencing sinus symptoms. No fever present, cough is productive at times and sputum is clear. Patient was asking for another round of antibiotics be called into her [facility] pharmacy. Please review and advise any recommendation to Julia at [phone].

## 2025-03-23 DIAGNOSIS — I10 ESSENTIAL HYPERTENSION: ICD-10-CM

## 2025-03-23 DIAGNOSIS — L40.9 PSORIASIS OF SCALP: ICD-10-CM

## 2025-03-23 DIAGNOSIS — D73.5 SPLENIC INFARCTION: ICD-10-CM

## 2025-03-24 RX ORDER — CLOPIDOGREL BISULFATE 75 MG/1
75 TABLET ORAL DAILY
Qty: 90 TABLET | Refills: 0 | Status: SHIPPED | OUTPATIENT
Start: 2025-03-24

## 2025-03-24 RX ORDER — CLOBETASOL PROPIONATE 0.5 MG/ML
SOLUTION TOPICAL 2 TIMES DAILY
Qty: 150 ML | Refills: 1 | Status: SHIPPED | OUTPATIENT
Start: 2025-03-24

## 2025-03-24 RX ORDER — LOSARTAN POTASSIUM 100 MG/1
100 TABLET ORAL DAILY
Qty: 90 TABLET | Refills: 0 | Status: SHIPPED | OUTPATIENT
Start: 2025-03-24

## 2025-04-18 ENCOUNTER — RA CDI HCC (OUTPATIENT)
Dept: OTHER | Facility: HOSPITAL | Age: 85
End: 2025-04-18

## 2025-04-23 ENCOUNTER — TELEPHONE (OUTPATIENT)
Age: 85
End: 2025-04-23

## 2025-04-23 NOTE — TELEPHONE ENCOUNTER
Patient called to say she completed labs at [facility] this morning, 4/23/25, and results should be available for Friday's appointment.

## 2025-04-25 ENCOUNTER — OFFICE VISIT (OUTPATIENT)
Dept: FAMILY MEDICINE CLINIC | Facility: CLINIC | Age: 85
End: 2025-04-25
Payer: COMMERCIAL

## 2025-04-25 VITALS
WEIGHT: 151 LBS | DIASTOLIC BLOOD PRESSURE: 84 MMHG | TEMPERATURE: 99 F | OXYGEN SATURATION: 95 % | SYSTOLIC BLOOD PRESSURE: 144 MMHG | HEART RATE: 74 BPM | BODY MASS INDEX: 25.16 KG/M2 | HEIGHT: 65 IN

## 2025-04-25 DIAGNOSIS — I83.813 VARICOSE VEINS OF BILATERAL LOWER EXTREMITIES WITH PAIN: ICD-10-CM

## 2025-04-25 DIAGNOSIS — M25.562 LEFT KNEE PAIN, UNSPECIFIED CHRONICITY: Primary | ICD-10-CM

## 2025-04-25 DIAGNOSIS — J38.01 PARALYSIS OF LEFT VOCAL FOLD: ICD-10-CM

## 2025-04-25 DIAGNOSIS — R73.01 ELEVATED FASTING GLUCOSE: ICD-10-CM

## 2025-04-25 DIAGNOSIS — D73.5 SPLENIC INFARCTION: ICD-10-CM

## 2025-04-25 DIAGNOSIS — I74.10 SHAGGY AORTA SYNDROME (HCC): ICD-10-CM

## 2025-04-25 DIAGNOSIS — R49.0 DYSPHONIA: ICD-10-CM

## 2025-04-25 DIAGNOSIS — J30.2 SEASONAL ALLERGIES: ICD-10-CM

## 2025-04-25 DIAGNOSIS — R76.8 ANA POSITIVE: ICD-10-CM

## 2025-04-25 DIAGNOSIS — L40.9 PSORIASIS OF SCALP: ICD-10-CM

## 2025-04-25 DIAGNOSIS — M35.09 SJOGREN'S SYNDROME WITH OTHER ORGAN INVOLVEMENT (HCC): ICD-10-CM

## 2025-04-25 DIAGNOSIS — Z98.890 HISTORY OF CAROTID ENDARTERECTOMY: ICD-10-CM

## 2025-04-25 DIAGNOSIS — E87.1 HYPONATREMIA: ICD-10-CM

## 2025-04-25 DIAGNOSIS — E78.2 MIXED HYPERLIPIDEMIA: ICD-10-CM

## 2025-04-25 DIAGNOSIS — I70.213 ATHEROSCLER OF NATIVE ARTERY OF BOTH LEGS WITH INTERMIT CLAUDICATION (HCC): ICD-10-CM

## 2025-04-25 DIAGNOSIS — I10 ESSENTIAL HYPERTENSION: ICD-10-CM

## 2025-04-25 PROCEDURE — 99214 OFFICE O/P EST MOD 30 MIN: CPT | Performed by: NURSE PRACTITIONER

## 2025-04-25 PROCEDURE — G2211 COMPLEX E/M VISIT ADD ON: HCPCS | Performed by: NURSE PRACTITIONER

## 2025-04-25 RX ORDER — CHLORAL HYDRATE 500 MG
2000 CAPSULE ORAL 2 TIMES DAILY
Qty: 360 CAPSULE | Refills: 0 | Status: SHIPPED | OUTPATIENT
Start: 2025-04-25 | End: 2025-07-24

## 2025-04-25 RX ORDER — OLOPATADINE HYDROCHLORIDE 2 MG/ML
1 SOLUTION/ DROPS OPHTHALMIC 2 TIMES DAILY
Qty: 60 DROP | Refills: 0 | Status: SHIPPED | OUTPATIENT
Start: 2025-04-25 | End: 2025-05-25

## 2025-04-25 RX ORDER — CLOBETASOL PROPIONATE 0.5 MG/ML
SOLUTION TOPICAL 2 TIMES DAILY
Qty: 150 ML | Refills: 1 | Status: SHIPPED | OUTPATIENT
Start: 2025-04-25

## 2025-04-25 RX ORDER — FLUTICASONE PROPIONATE 50 MCG
2 SPRAY, SUSPENSION (ML) NASAL DAILY
Qty: 60 ML | Refills: 1 | Status: SHIPPED | OUTPATIENT
Start: 2025-04-25 | End: 2025-07-24

## 2025-04-25 RX ORDER — ASPIRIN 81 MG/1
81 TABLET, COATED ORAL DAILY
Qty: 90 TABLET | Refills: 1 | Status: SHIPPED | OUTPATIENT
Start: 2025-04-25

## 2025-04-25 RX ORDER — CLOPIDOGREL BISULFATE 75 MG/1
75 TABLET ORAL DAILY
Qty: 90 TABLET | Refills: 0 | Status: SHIPPED | OUTPATIENT
Start: 2025-04-25

## 2025-04-25 RX ORDER — ATORVASTATIN CALCIUM 80 MG/1
80 TABLET, FILM COATED ORAL DAILY
Qty: 90 TABLET | Refills: 0 | Status: SHIPPED | OUTPATIENT
Start: 2025-04-25

## 2025-04-25 RX ORDER — LABETALOL 200 MG/1
200 TABLET, FILM COATED ORAL EVERY 8 HOURS
Qty: 270 TABLET | Refills: 0 | Status: SHIPPED | OUTPATIENT
Start: 2025-04-25

## 2025-04-25 RX ORDER — LOSARTAN POTASSIUM 100 MG/1
100 TABLET ORAL DAILY
Qty: 90 TABLET | Refills: 0 | Status: SHIPPED | OUTPATIENT
Start: 2025-04-25

## 2025-04-25 RX ORDER — LEVOCETIRIZINE DIHYDROCHLORIDE 2.5 MG/5ML
5 SOLUTION ORAL EVERY EVENING
Qty: 900 ML | Refills: 0 | Status: SHIPPED | OUTPATIENT
Start: 2025-04-25 | End: 2025-07-24

## 2025-04-25 RX ORDER — MONTELUKAST SODIUM 10 MG/1
10 TABLET ORAL
Qty: 90 TABLET | Refills: 1 | Status: SHIPPED | OUTPATIENT
Start: 2025-04-25

## 2025-04-25 NOTE — ASSESSMENT & PLAN NOTE
She usually has a low sodium has been stable at that level for a long time under care of nephrology  Orders:    Comprehensive metabolic panel; Future    CBC and differential; Future    TSH, 3rd generation with Free T4 reflex; Future    Lipid panel; Future    UA/M w/rflx Culture, Routine; Future    Hemoglobin A1C; Future

## 2025-04-25 NOTE — ASSESSMENT & PLAN NOTE
Hyperlipidemia stable  Hyperlipidemia diagnoses assessed and discussed   Reviewed medications and plan of care   Labs reviewed   01/25/2023   Discussed low fat low cholesterol diet   Discussed exercise and adequate hydration   Will continue to monitor every 4 months     Orders:    atorvastatin (LIPITOR) 80 mg tablet; Take 1 tablet (80 mg total) by mouth daily    Comprehensive metabolic panel; Future    CBC and differential; Future    TSH, 3rd generation with Free T4 reflex; Future    Lipid panel; Future    UA/M w/rflx Culture, Routine; Future    Hemoglobin A1C; Future

## 2025-04-25 NOTE — ASSESSMENT & PLAN NOTE
Hypertension has been stable  She has high blood pressures coming in the office due to whitecoat syndrome she did bring her blood pressure readings from home and on average they are running between 110 to 140s systolic/60s to 80s diastolic.   HTN assessed for stability and discussed plan of care   CrCl cannot be calculated (Patient's most recent lab result is older than the maximum 7 days allowed.).   Reviewed labs .  She continues to be under care of nephrology and doing well no adjustment to medications at this point  No changes   We will continue to monitor    Orders:    losartan (COZAAR) 100 MG tablet; Take 1 tablet (100 mg total) by mouth daily    labetalol (NORMODYNE) 200 mg tablet; Take 1 tablet (200 mg total) by mouth every 8 (eight) hours    Omega-3 Fatty Acids (fish oil) 1,000 mg; Take 2 capsules (2,000 mg total) by mouth 2 (two) times a day    atorvastatin (LIPITOR) 80 mg tablet; Take 1 tablet (80 mg total) by mouth daily    Comprehensive metabolic panel; Future    CBC and differential; Future    TSH, 3rd generation with Free T4 reflex; Future    Lipid panel; Future    UA/M w/rflx Culture, Routine; Future    Hemoglobin A1C; Future

## 2025-04-25 NOTE — ASSESSMENT & PLAN NOTE
She is under care of vascular for monitoring she is compliant on her statins.   Orders:    atorvastatin (LIPITOR) 80 mg tablet; Take 1 tablet (80 mg total) by mouth daily

## 2025-04-25 NOTE — PROGRESS NOTES
Name: Julia Imbt      : 1940      MRN: [id number]  Encounter Provider: KRISTINA Holden  Encounter Date: 2025   Encounter department: [facility]  :  Assessment & Plan  Essential hypertension  Hypertension has been stable  She has high blood pressures coming in the office due to whitecoat syndrome she did bring her blood pressure readings from home and on average they are running between 110 to 140s systolic/60s to 80s diastolic.   HTN assessed for stability and discussed plan of care   CrCl cannot be calculated (Patient's most recent lab result is older than the maximum 7 days allowed.).   Reviewed labs .  She continues to be under care of nephrology and doing well no adjustment to medications at this point  No changes   We will continue to monitor    Orders:    losartan (COZAAR) 100 MG tablet; Take 1 tablet (100 mg total) by mouth daily    labetalol (NORMODYNE) 200 mg tablet; Take 1 tablet (200 mg total) by mouth every 8 (eight) hours    Omega-3 Fatty Acids (fish oil) 1,000 mg; Take 2 capsules (2,000 mg total) by mouth 2 (two) times a day    atorvastatin (LIPITOR) 80 mg tablet; Take 1 tablet (80 mg total) by mouth daily    Comprehensive metabolic panel; Future    CBC and differential; Future    TSH, 3rd generation with Free T4 reflex; Future    Lipid panel; Future    UA/M w/rflx Culture, Routine; Future    Hemoglobin A1C; Future    Shaggy aorta syndrome (HCC)  She is under care of vascular for monitoring she is compliant on her statins.   Orders:    atorvastatin (LIPITOR) 80 mg tablet; Take 1 tablet (80 mg total) by mouth daily    Left knee pain, unspecified chronicity  Discussed follow-up with Ortho send her over to Dr. Garcia she does have some fluid that probably needs to be aspirated  Orders:    Ambulatory Referral to Orthopedic Surgery; Future    Seasonal allergies  Discussed management of seasonal allergy to prevent upper respiratory issues discussed treatment  options  Orders:    levocetirizine (XYZAL) 2.5 MG/5ML solution; Take 10 mL (5 mg total) by mouth every evening    ascorbic acid (VITAMIN C) 1000 MG tablet; Take 1 tablet (1,000 mg total) by mouth daily    fluticasone (FLONASE) 50 mcg/act nasal spray; 2 sprays into each nostril daily Spray 1 spray into each nostril every day    montelukast (SINGULAIR) 10 mg tablet; Take 1 tablet (10 mg total) by mouth daily at bedtime    olopatadine HCl (PATADAY) 0.2 % opth drops; Administer 1 drop to both eyes 2 (two) times a day    Mixed hyperlipidemia  Hyperlipidemia stable  Hyperlipidemia diagnoses assessed and discussed   Reviewed medications and plan of care   Labs reviewed   01/25/2023   Discussed low fat low cholesterol diet   Discussed exercise and adequate hydration   Will continue to monitor every 4 months     Orders:    atorvastatin (LIPITOR) 80 mg tablet; Take 1 tablet (80 mg total) by mouth daily    Comprehensive metabolic panel; Future    CBC and differential; Future    TSH, 3rd generation with Free T4 reflex; Future    Lipid panel; Future    UA/M w/rflx Culture, Routine; Future    Hemoglobin A1C; Future    Psoriasis of scalp  Stable   Orders:    clobetasol (TEMOVATE) 0.05 % external solution; Apply topically 2 (two) times a day To scalp for psoriasis    Splenic infarction  Stable blood counts   Orders:    Aspirin Low Dose 81 MG EC tablet; Take 1 tablet (81 mg total) by mouth daily    clopidogrel (PLAVIX) 75 mg tablet; Take 1 tablet (75 mg total) by mouth daily    Atheroscler 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 (HCC)  Under care of vascular       Varicose veins of bilateral lower extremities with pain  Under care of vascular specialist       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       Sjogren's syndrome with other organ involvement (HCC)  Stable       History of carotid endarterectomy  Under care of vascular surgery       TULIO positive  Does see rheumatology        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 They screened negative with a PHQ-2 score of 1.      Falls Plan of Care: balance, strength, and gait training instructions were provided. Recommended assistive device to help with gait and balance. Medications that increase falls were reviewed. Assessed feet and footwear. Vitamin D supplementation was recommended. Home safety education provided.     Urinary Incontinence Plan of Care: counseling topics discussed: practice Kegel (pelvic floor strengthening) exercises, use restroom every 2 hours, limit alcohol, caffeine, spicy foods, and acidic foods, keeping a bladder diary, limiting fluid intake 3-4 hours before bed, weight loss, preventing constipation, taking fluid pills at a time when you can get to bathroom easily, improving blood sugar control, limiting fluid intake to 60 oz. per day, wearing ROVERTO stockings for edema control and bladder retraining.       History of Present Illness   Pt is a 85  yr old female   Presents in office for 6 month follow up on multiple underlying issues   HTN - stable - elevated BP in office due to white coat syndrome   Hyperlipidemia - much improved   Hyponatremia - under care of nephrology has been stable low in the 127-130s   CKD stable   Denies any issues today           Review of Systems   Constitutional:  Negative for chills, fatigue, fever and unexpected weight change.   HENT:  Positive for  "trouble swallowing. Negative for congestion, dental problem, postnasal drip, sinus pressure and sore throat.         Swallowing issues   Does have some some vocal paralysis    Eyes: Negative.  Negative for pain, discharge and itching.   Respiratory:  Negative for cough and shortness of breath.    Cardiovascular:  Negative for chest pain and leg swelling.        HTN - white coat syndrome elevated in office well controlled at home log has been scanned in chart - has been stable   hyperlipidemia - stable    in the past - continues to see Vascular    Gastrointestinal:  Negative for abdominal distention, abdominal pain, nausea and vomiting.   Endocrine: Positive for cold intolerance.   Genitourinary:  Negative for difficulty urinating, dysuria and flank pain.        History of Na being low under care of Nephrology    Musculoskeletal:  Negative for back pain (upper back pain ), myalgias and neck stiffness.   Skin:  Negative for rash (still has some scalp erythema but much better ).   Allergic/Immunologic: Positive for environmental allergies.   Neurological:  Negative for dizziness, weakness and headaches.   Hematological:  Negative for adenopathy.   Psychiatric/Behavioral:  Negative for agitation, sleep disturbance and suicidal ideas. The patient is not nervous/anxious.        Objective   /84   Pulse 74   Temp 99 °F (37.2 °C)   Ht 5' 5.25\" (1.657 m)   Wt 68.5 kg (151 lb)   SpO2 95%   BMI 24.94 kg/m²      Physical Exam  Vitals and nursing note reviewed.   Constitutional:       Appearance: Normal appearance.   HENT:      Head: Atraumatic.   Eyes:      Extraocular Movements: Extraocular movements intact.   Cardiovascular:      Rate and Rhythm: Normal rate and regular rhythm.      Heart sounds: Normal heart sounds.   Pulmonary:      Breath sounds: Normal breath sounds.   Abdominal:      Palpations: Abdomen is soft.   Musculoskeletal:      Cervical back: Normal range of motion.      Right lower leg: No edema. "      Left lower leg: No edema.   Skin:     General: Skin is warm.   Neurological:      General: No focal deficit present.      Mental Status: She is alert and oriented to person, place, and time.   Psychiatric:         Mood and Affect: Mood normal.         Behavior: Behavior normal.       LABS done at [facility]   Reviewed in office   Blood work reviewed glucose 97 BUN 10 creatinine 0.67 GFR 87 sodium 128 potassium 4.5 liver functions are stable and normal calcium 9.0  Lipid panel  Total cholesterol 147 triglycerides 45 HDL 67 LDL 70 which is perfect  Thyroid function TSH is 2.500  White blood cells 4.4  Hemoglobin and hematocrit 12.6 /37.2  Platelets 221  The rest of the blood counts look normal urinalysis looks normal  Administrative Statements   I have spent a total time of 45  minutes in caring for this patient on the day of the visit/encounter including Diagnostic results, Prognosis, Risks and benefits of tx options, Instructions for management, Patient and family education, Importance of tx compliance, Risk factor reductions, Impressions, Counseling / Coordination of care, Documenting in the medical record, Reviewing/placing orders in the medical record (including tests, medications, and/or procedures), Obtaining or reviewing history  , and Communicating with other healthcare professionals .

## 2025-04-29 ENCOUNTER — TELEPHONE (OUTPATIENT)
Dept: FAMILY MEDICINE CLINIC | Facility: CLINIC | Age: 85
End: 2025-04-29

## 2025-04-29 NOTE — TELEPHONE ENCOUNTER
Xyzal and Labetalol hydrocloride drug interactions - [facility]  We study [id number] people who take Xyzal (levocetirizine dihydrochloride) and Labetalol hydrocloride (labetalol hydrochloride). There is no drug interaction reported.    It should not be an issue- she can try and see how she feels

## 2025-04-29 NOTE — TELEPHONE ENCOUNTER
Patient is calling about Xyzal.  There is a contraindication with labetaolol, it can make her dizziness, or cough.  She is worried and wants your advise.  It also says it can cause a cough but she already has a cough from her BP meds.

## 2025-05-05 ENCOUNTER — APPOINTMENT (OUTPATIENT)
Dept: RADIOLOGY | Facility: CLINIC | Age: 85
End: 2025-05-05
Attending: FAMILY MEDICINE
Payer: COMMERCIAL

## 2025-05-05 ENCOUNTER — OFFICE VISIT (OUTPATIENT)
Dept: OBGYN CLINIC | Facility: CLINIC | Age: 85
End: 2025-05-05
Payer: COMMERCIAL

## 2025-05-05 VITALS — BODY MASS INDEX: 25.16 KG/M2 | WEIGHT: 151 LBS | HEIGHT: 65 IN

## 2025-05-05 DIAGNOSIS — M25.562 LEFT KNEE PAIN, UNSPECIFIED CHRONICITY: ICD-10-CM

## 2025-05-05 DIAGNOSIS — M17.12 PRIMARY OSTEOARTHRITIS OF LEFT KNEE: Primary | ICD-10-CM

## 2025-05-05 PROCEDURE — 73562 X-RAY EXAM OF KNEE 3: CPT

## 2025-05-05 PROCEDURE — 99204 OFFICE O/P NEW MOD 45 MIN: CPT | Performed by: FAMILY MEDICINE
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
## 2025-05-06 ENCOUNTER — RESULTS FOLLOW-UP (OUTPATIENT)
Dept: FAMILY MEDICINE CLINIC | Facility: CLINIC | Age: 85
End: 2025-05-06

## 2025-05-06 NOTE — RESULT ENCOUNTER NOTE
NEEDS TO SEE ORTHO   Degenerative arthritis left knee with slight medial compartment narrowing.    Chondrocalcinosis left knee.

## 2025-07-23 DIAGNOSIS — J30.2 SEASONAL ALLERGIES: ICD-10-CM

## 2025-07-23 RX ORDER — LEVOCETIRIZINE DIHYDROCHLORIDE 2.5 MG/5ML
5 SOLUTION ORAL EVERY EVENING
Qty: 900 ML | Refills: 0 | Status: SHIPPED | OUTPATIENT
Start: 2025-07-23 | End: 2025-10-21

## 2025-07-25 DIAGNOSIS — I74.10 SHAGGY AORTA SYNDROME (HCC): ICD-10-CM

## 2025-07-25 DIAGNOSIS — D73.5 SPLENIC INFARCTION: ICD-10-CM

## 2025-07-25 DIAGNOSIS — I10 ESSENTIAL HYPERTENSION: ICD-10-CM

## 2025-07-25 DIAGNOSIS — E78.2 MIXED HYPERLIPIDEMIA: ICD-10-CM

## 2025-07-25 RX ORDER — CLOPIDOGREL BISULFATE 75 MG/1
75 TABLET ORAL DAILY
Qty: 90 TABLET | Refills: 0 | Status: SHIPPED | OUTPATIENT
Start: 2025-07-25

## 2025-07-25 RX ORDER — LABETALOL 200 MG/1
200 TABLET, FILM COATED ORAL EVERY 8 HOURS
Qty: 270 TABLET | Refills: 0 | Status: SHIPPED | OUTPATIENT
Start: 2025-07-25

## 2025-07-25 RX ORDER — ATORVASTATIN CALCIUM 80 MG/1
80 TABLET, FILM COATED ORAL DAILY
Qty: 90 TABLET | Refills: 0 | Status: SHIPPED | OUTPATIENT
Start: 2025-07-25